# Patient Record
Sex: MALE | Race: WHITE | NOT HISPANIC OR LATINO | Employment: UNEMPLOYED | URBAN - METROPOLITAN AREA
[De-identification: names, ages, dates, MRNs, and addresses within clinical notes are randomized per-mention and may not be internally consistent; named-entity substitution may affect disease eponyms.]

---

## 2019-06-19 ENCOUNTER — HOSPITAL ENCOUNTER (OUTPATIENT)
Dept: BEHAVIORAL HEALTH | Facility: CLINIC | Age: 24
Discharge: HOME | End: 2019-06-19
Attending: SOCIAL WORKER | Admitting: SOCIAL WORKER
Payer: COMMERCIAL

## 2019-06-19 VITALS — HEIGHT: 70 IN | WEIGHT: 135 LBS | BODY MASS INDEX: 19.33 KG/M2

## 2019-06-19 PROCEDURE — H0001 ALCOHOL AND/OR DRUG ASSESS: HCPCS

## 2019-06-19 ASSESSMENT — ANXIETY QUESTIONNAIRES
GAD7 TOTAL SCORE: 2
1. FEELING NERVOUS, ANXIOUS, OR ON EDGE: NOT AT ALL
2. NOT BEING ABLE TO STOP OR CONTROL WORRYING: NOT AT ALL
6. BECOMING EASILY ANNOYED OR IRRITABLE: NOT AT ALL
4. TROUBLE RELAXING: SEVERAL DAYS
5. BEING SO RESTLESS THAT IT IS HARD TO SIT STILL: NOT AT ALL
7. FEELING AFRAID AS IF SOMETHING AWFUL MIGHT HAPPEN: NOT AT ALL
3. WORRYING TOO MUCH ABOUT DIFFERENT THINGS: SEVERAL DAYS

## 2019-06-19 ASSESSMENT — MIFFLIN-ST. JEOR: SCORE: 1613.61

## 2019-06-19 ASSESSMENT — PAIN SCALES - GENERAL: PAINLEVEL: NO PAIN (0)

## 2019-06-19 NOTE — PROGRESS NOTES
Rule 25 Assessment  Background Information   1. Date of Assessment Request 6/18/19 2. Date of Assessment  6/19/2019 3. Date Service Authorized     4.   GWEN Monson   5.  Phone Number   [phone] 6. Referent  [facility] Probation 7. Assessment Site  [facility]     8. Client Name   Clint Gay 9. Date of Birth  1995 Age  24 year old 10. Gender  male  11. PMI/ Insurance No.  [id number]   12. Client's Primary Language:  English 13. Do you require special accommodations, such as an  or assistance with written material? No   14. Current Address: [address] [place] [address]   15. Client Phone Numbers: [phone] (home)      16. Tell me what has happened to bring you here today.    Ended up getting a disorderly conduct charge, and now this is a probation requirement.    17. Have you had other rule 25 assessments?     No    DIMENSION I - Acute Intoxication /Withdrawal Potential   1. Chemical use most recent 12 months outside a facility and other significant use history (client self-report)              X = Primary Drug Used   Age of First Use Most Recent Pattern of Use and Duration   Need enough information to show pattern (both frequency and amounts) and to show tolerance for each chemical that has a diagnosis   Date of last use and time, if needed   Withdrawal Potential? Requiring special care Method of use  (oral, smoked, snort, IV, etc)      Alcohol     19 Past cpl years: 1-2 wknds/month, 2-3 beers.  Occasional day 1-2 beers w/dinner  Age [age] (): every wknd for a short period of time   6/9/19 no oral      Marijuana/  Hashish   unsp Denies any regular use. Age 21 no smoke      Cocaine/Crack     No use          Meth/  Amphetamines   No use          Heroin     No use          Other Opiates/  Synthetics   No use          Inhalants     No use          Benzodiazepines     No use          Hallucinogens     No use           Barbiturates/  Sedatives/  Hypnotics No use          Over-the-Counter Drugs   No use          Other     No use          Nicotine     unsp Juul, every other day. 20 no smoke     2. Do you use greater amounts of alcohol/other drugs to feel intoxicated or achieve the desired effect?  No.  Or use the same amount and get less of an effect?  No.  Example: The patient denied having any history of tolerance with alcohol and/or drugs.    3A. Have you ever been to detox?     Yes    3B. When was the first time?     Age 19    3C. How many times since then?     0    3D. Date of most recent detox:     Age 19    4.  Withdrawal symptoms: Have you had any of the following withdrawal symptoms?  Past 12 months Recent (past 30 days)   None None     's Visual Observations and Symptoms: alert and oriented    Based on the above information, is withdrawal likely to require attention as part of treatment participation?  No    Dimension I Ratings   Acute intoxication/Withdrawal potential - The placing authority must use the criteria in Dimension I to determine a client s acute intoxication and withdrawal potential.    RISK DESCRIPTIONS - Severity ratin Client displays full functioning with good ability to tolerate and cope with withdrawal discomfort. No signs or symptoms of intoxication or withdrawal or resolving signs or symptoms.    REASONS SEVERITY WAS ASSIGNED (What about the amount of the person s use and date of most recent use and history of withdrawal problems suggests the potential of withdrawal symptoms requiring professional assistance? )     No concern.         DIMENSION II - Biomedical Complications and Conditions   1a. Do you have any current health/medical conditions?(Include any infectious diseases, allergies, or chronic or acute pain, history of chronic conditions)       No    1b. On a scale of mild, moderate to severe please specify the severity of the patient's diabetes and/or neuropathy.    The patient  denied having a history of being diagnosed with diabetes or neuropathy.    2. Do you have a health care provider? When was your most recent appointment? What concerns were identified?     Kennedy Denson as needed    3. If indicated by answers to items 1 or 2: How do you deal with these concerns? Is that working for you? If you are not receiving care for this problem, why not?      The patient denied having any current clinical health issues.    4A. List current medication(s) including over-the-counter or herbal supplements--including pain management:     None    4B. Do you follow current medical recommendations/take medications as prescribed?     The patient denied taking any prescription or over the counter medications at this time.    4C. When did you last take your medication?     The patient denied taking any prescription or over the counter medications at this time.    4D. Do you need a referral to have a follow up with a primary care physician?    No.    5. Has a health care provider/healer ever recommended that you reduce or quit alcohol/drug use?     No    6. Are you pregnant?     NA, because the patient is male    7. Have you had any injuries, assaults/violence towards you, accidents, health related issues, overdose(s) or hospitalizations related to your use of alcohol or other drugs:     No    8. Do you have any specific physical needs/accommodations? No    Dimension II Ratings   Biomedical Conditions and Complications - The placing authority must use the criteria in Dimension II to determine a client s biomedical conditions and complications.   RISK DESCRIPTIONS - Severity ratin Client displays full functioning with good ability to cope with physical discomfort.    REASONS SEVERITY WAS ASSIGNED (What physical/medical problems does this person have that would inhibit his or her ability to participate in treatment? What issues does he or she have that require assistance to address?)    No health issues  reported.         DIMENSION III - Emotional, Behavioral, Cognitive Conditions and Complications   1. (Optional) Tell me what it was like growing up in your family. (substance use, mental health, discipline, abuse, support)     Raised by biological parents, 2 sisters and 1 brother, youngest.  Intact family.  No substance use, no mental health.  Denies any abuse.    2. When was the last time that you had significant problems...  A. with feeling very trapped, lonely, sad, blue, depressed or hopeless  about the future? Never    B. with sleep trouble, such as bad dreams, sleeping restlessly, or falling  asleep during the day? Never    C. with feeling very anxious, nervous, tense, scared, panicked, or like  something bad was going to happen? Never    D. with becoming very distressed and upset when something reminded  you of the past? Never    E. with thinking about ending your life or committing suicide? Never    3. When was the last time that you did the following things two or more times?  A. Lied or conned to get things you wanted or to avoid having to do  something? Never    B. Had a hard time paying attention at school, work, or home? Never    C. Had a hard time listening to instructions at school, work, or home? Never    D. Were a bully or threatened other people? Never    E. Started physical fights with other people? Never    Note: These questions are from the Global Appraisal of Individual Needs--Short Screener. Any item marked  past month  or  2 to 12 months ago  will be scored with a severity rating of at least 2.     For each item that has occurred in the past month or past year ask follow up questions to determine how often the person has felt this way or has the behavior occurred? How recently? How has it affected their daily living? And, whether they were using or in withdrawal at the time?    The patient did not identify as having any of the above items in the past month.    4A. If the person has answered  item 2E with  in the past year  or  the past month , ask about frequency and history of suicide in the family or someone close and whether they were under the influence.     The patient denied any family member or someone close to the patient had ever completed suicide.    Any history of suicide in your family? Or someone close to you?     Paternal uncle    4B. If the person answered item 2E  in the past month  ask about  intent, plan, means and access and any other follow-up information  to determine imminent risk. Document any actions taken to intervene  on any identified imminent risk.      The patient denied having any suicide ideation within the past month.    5A. Have you ever been diagnosed with a mental health problem?     No      5B. Are you receiving care for any mental health issues? If yes, what is the focus of that care or treatment?  Are you satisfied with the service? Most recent appointment?  How has it been helpful?     The patient denied having any current or past mental health issues that had required treatment.    6. Have you been prescribed medications for emotional/psychological problems?     The patient denied having any history of being prescribed psychotropic medications for mental health issues.    7. Does your MH provider know about your use?     The patient does not currently have any mental health providers.    8A. Have you ever been verbally, emotionally, physically or sexually abused?      The patient denied having any history of being verbally, emotionally, physically or sexually abused.     Follow up questions to learn current risk, continuing emotional impact.      The patient denied having any history of being verbally, emotionally, physically or sexually abused.    8B. Have you received counseling for abuse?      The patient denied having any history of being verbally, emotionally, physically or sexually abused.    9. Have you ever experienced or been part of a group that  experienced community violence, historical trauma, rape or assault?     No    10A. :    No    11. Do you have problems with any of the following things in your daily life?    No      Note: If the person has any of the above problems, follow up with items 12, 13, and 14. If none of the issues in item 11 are a problem for the person, skip to item 15.    The patient denied having any history of having problems with headaches, dizziness, problem solving, concentration, performing job/school work, remembering, in relationships with others, reading, writing, calculation, fights, being fired or arrests in his daily life.    12. Have you been diagnosed with traumatic brain injury or Alzheimer s?  No    13. If the answer to #12 is no, ask the following questions:    Have you ever hit your head or been hit on the head? No    Were you ever seen in the Emergency Room, hospital or by a doctor because of an injury to your head? No    Have you had any significant illness that affected your brain (brain tumor, meningitis, West Nile Virus, stroke or seizure, heart attack, near drowning or near suffocation)? No    14. If the answer to #12 is yes, ask if any of the problems identified in #11 occurred since the head injury or loss of oxygen. No    15A. Highest grade of school completed:     Associate degree/vocational certificate    15B. Do you have a learning disability? No    15C. Did you ever have tutoring in Math or English? No    15D. Have you ever been diagnosed with Fetal Alcohol Effects or Fetal Alcohol Syndrome? No    16. If yes to item 15 B, C, or D: How has this affected your use or been affected by your use?     The patient denied having any history of a learning disability, tutoring in math or English or being diagnosed with Fetal Alcohol Effects or Fetal Alcohol Syndrome.    Dimension III Ratings   Emotional/Behavioral/Cognitive - The placing authority must use the criteria in Dimension III to determine a client s  emotional, behavioral, and cognitive conditions and complications.   RISK DESCRIPTIONS - Severity ratin Client has good impulse control and coping skills and presents no risk of harm to self or others. Client functions in all life areas and displays no emotional, behavioral or cognitive problems or the problems are stable..    REASONS SEVERITY WAS ASSIGNED - What current issues might with thinking, feelings or behavior pose barriers to participation in a treatment program? What coping skills or other assets does the person have to offset those issues? Are these problems that can be initially accommodated by a treatment provider? If not, what specialized skills or attributes must a provider have?    Client denies any history of mental health diagnosis or current concern.  He denies any suicidal ideation.         DIMENSION IV - Readiness for Change   1. You ve told me what brought you here today. (first section) What do you think the problem really is?     It is anger, the night I got a little too mad and was being dumb.    2. Tell me how things are going. Ask enough questions to determine whether the person has use related problems or assets that can be built upon in the following areas: Family/friends/relationships; Legal; Financial; Emotional; Educational; Recreational/ leisure; Vocational/employment; Living arrangements (DSM)      No concerns.    3. What activities have you engaged in when using alcohol/other drugs that could be hazardous to you or others (i.e. driving a car/motorcycle/boat, operating machinery, unsafe sex, sharing needles for drugs or tattoos, etc     Driving    4. How much time do you spend getting, using or getting over using alcohol or drugs? (DSM)     One evening    5. Reasons for drinking/drug use (Use the space below to record answers. It may not be necessary to ask each item.)  Like the feeling Yes   Trying to forget problems No   To cope with stress No   To relieve physical pain No    To cope with anxiety No   To cope with depression No   To relax or unwind Yes   Makes it easier to talk with people No   Partner encourages use No   Most friends drink or use Yes   To cope with family problems No   Afraid of withdrawal symptoms/to feel better No   Other (specify)  No     A. What concerns other people about your alcohol or drug use/Has anyone told you that you use too much? What did they say? (DSM)     I don't think so.    B. What did you think about that/ do you think you have a problem with alcohol or drug use?     I do not.    6. What changes are you willing to make? What substance are you willing to stop using? How are you going to do that? Have you tried that before? What interfered with your success with that goal?      No changes needed.    7. What would be helpful to you in making this change?     No change needed.    Dimension IV Ratings   Readiness for Change - The placing authority must use the criteria in Dimension IV to determine a client s readiness for change.   RISK DESCRIPTIONS - Severity ratin Client is cooperative, motivated, ready to change, admits problems, committed to change, and engaged in treatment as a responsible participant.    REASONS SEVERITY WAS ASSIGNED - (What information did the person provide that supports your assessment of his or her readiness to change? How aware is the person of problems caused by continued use? How willing is she or he to make changes? What does the person feel would be helpful? What has the person been able to do without help?)      No identified concerns that require change.         DIMENSION V - Relapse, Continued Use, and Continued Problem Potential   1A. In what ways have you tried to control, cut-down or quit your use? If you have had periods of sobriety, how did you accomplish that? What was helpful? What happened to prevent you from continuing your sobriety? (DSM)     I have gone a couple months without use.  In a night maybe when  I had drank too much in one night thought I should have on more shot.    1B. What were the circumstances of your most recent relapse with mood altering chemicals?    No relapse.    2. Have you experienced cravings? If yes, ask follow up questions to determine if the person recognizes triggers and if the person has had any success in dealing with them.     The patient denied having any current cravings to use mood altering chemicals.    3. Have you been treated for alcohol/other drug abuse/dependence? No    4. Support group participation: Have you/do you attend support group meetings to reduce/stop your alcohol/drug use? How recently? What was your experience? Are you willing to restart? If the person has not participated, is he or she willing?     No.    5. What would assist you in staying sober/straight?     No change needed.    Dimension V Ratings   Relapse/Continued Use/Continued problem potential - The placing authority must use the criteria in Dimension V to determine a client s relapse, continued use, and continued problem potential.   RISK DESCRIPTIONS - Severity ratin Client recognizes risk well and is able to manage potential problems.    REASONS SEVERITY WAS ASSIGNED - (What information did the person provide that indicates his or her understanding of relapse issues? What about the person s experience indicates how prone he or she is to relapse? What coping skills does the person have that decrease relapse potential?)      Client denies any history of treatment or consequences from substance use.  Minimal risk identified for ongoing problems.         DIMENSION VI - Recovery Environment   1. Are you employed/attending school? Tell me about that.     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    2A. Describe a typical day; evening for you. Work, school, social, leisure, volunteer, spiritual practices. Include time spent obtaining, using, recovering from  drugs or alcohol. (DSM)     Wake up, go to work, hit the gym after work, then I go out a lot with my girlfriend for a walk or take my dog to the dog park or hang with friends.  Also, a lot of time we just relax.    Please describe what leisure activities have been associated with your substance abuse:     If we were going out on a boat maybe have some drinks but not when we play basketball or other sports.    2B. How often do you spend more time than you planned using or use more than you planned? (DSM)     Maybe like 10%    3. How important is using to your social connections? Do many of your family or friends use?     Social use    4A. Are you currently in a significant relationship?     Yes.  4B. How long? 2 years            Please describe your significant other's use of mood altering chemicals? No concerns.    4C. Sexual Orientation:     Heterosexual    5A. Who do you live with?      With my mom and dad    5B. Tell me about their alcohol/drug use and mental health issues.     None.    5C. Are you concerned for your safety there? No    5D. Are you concerned about the safety of anyone else who lives with you? No    6A. Do you have children who live with you?     No    6B. Do you have children who do not live with you?     No    7A. Who supports you in making changes in your alcohol or drug use? What are they willing to do to support you? Who is upset or angry about you making changes in your alcohol or drug use? How big a problem is this for you?      Family, girlfriend, friends    7B. This table is provided to record information about the person s relationships and available support It is not necessary to ask each item; only to get a comprehensive picture of their support system.  How often can you count on the following people when you need someone?   Partner / Spouse Always supportive   Parent(s)/Aunt(s)/Uncle(s)/Grandparents Always supportive   Sibling(s)/Cousin(s) Always supportive   Child(claudia) The patient  doesn't have any children.   Other relative(s) The patient doesn't have any current contact with other relatives.   Friend(s)/neighbor(s) Always supportive   Child(claudia) s father(s)/mother(s) The patient doesn't have any children.   Support group member(s) The patient denied having any current involvement with 12-step or other support group meetings.   Community of rodriguez members The patient denied having any current involvement with community rodriguez members.   /counselor/therapist/healer The patient denied having any current involvement with a , counselor, therapist or healer.   Other (specify) No     8A. What is your current living situation?     Independent living with family    8B. What is your long term plan for where you will be living?     Me and my girlfriend are moving out very soon    8C. Tell me about your living environment/neighborhood? Ask enough follow up questions to determine safety, criminal activity, availability of alcohol and drugs, supportive or antagonistic to the person making changes.      No concerns reported.    9. Criminal justice history: Gather current/recent history and any significant history related to substance use--Arrests? Convictions? Circumstances? Alcohol or drug involvement? Sentences? Still on probation or parole? Expectations of the court? Current court order? Any sex offenses - lifetime? What level? (DSM)    Current [facility] Probation for disorderly conduct.  Age 17 got a drinking and driving ticket.    10. What obstacles exist to participating in treatment? (Time off work, childcare, funding, transportation, pending retirement time, living situation)     The patient denied having any obstacles for participating in substance abuse treatment.    Dimension VI Ratings   Recovery environment - The placing authority must use the criteria in Dimension VI to determine a client s recovery environment.   RISK DESCRIPTIONS - Severity ratin Client is engaged  in structured, meaningful activity and has a supportive significant other, family, and living environment.    REASONS SEVERITY WAS ASSIGNED - (What support does the person have for making changes? What structure/stability does the person have in his or her daily life that will increase the likelihood that changes can be sustained? What problems exist in the person s environment that will jeopardize getting/staying clean and sober?)     Client reports he is employed full-time and denies any job concerns.  He lives with family and plans to move in with his girlfriend.  He reports he has supportive relationships.  He is on probation for a non-substance related legal charge.         Client Choice/Exceptions   Would you like services specific to language, age, gender, culture, Islam preference, race, ethnicity, sexual orientation or disability?  No    What particular treatment choices and options would you like to have? None    Do you have a preference for a particular treatment program? None    Criteria for Diagnosis     Criteria for Diagnosis  DSM-5 Criteria for Substance Use Disorder  Instructions: Determine whether the client currently meets the criteria for Substance Use Disorder using the diagnostic criteria in the DSM-V pp.[id number]. Current means during the most recent 12 months outside a facility that controls access to substances    Category of Substance Severity (ICD-10 Code / DSM 5 Code)     Alcohol Use Disorder The patient does not meet the criteria for an Alcohol use disorder.   Cannabis Use Disorder The patient does not meet the criteria for a Cannabis use disorder.   Hallucinogen Use Disorder The patient does not meet the criteria for a Hallucinogen use disorder.   Inhalant Use Disorder The patient does not meet the criteria for an Inhalant use disorder.   Opioid Use Disorder The patient does not meet the criteria for an Opioid use disorder.   Sedative, Hypnotic, or Anxiolytic Use Disorder The patient  does not meet the criteria for a Sedative/Hypnotic use disorder.   Stimulant Related Disorder The patient does not meet the criteria for a Stimulant use disorder.   Tobacco Use Disorder The patient does not meet the criteria for a Tobacco use disorder.   Other (or unknown) Substance Use Disorder The patient does not meet the criteria for a Other (or unknown) Substance use disorder.       Collateral Contact Summary   Number of contacts made: 2    Contact with referring person:  Yes    If court related records were reviewed, summarize here: No court records had been reviewed at the time of this documentation.    Information from collateral contacts supported/largely agreed with information from the client and associated risk ratings.      Rule 25 Assessment Summary and Plan   's Recommendation    No recommendations for substance use services at this time.      Collateral Contacts     Name:    [facility] Probation   Relationship:    probation   Phone Number:    [phone]  [phone] Releases:    Yes     6/19/19 faxed MARQUITA and requested collateral.      Collateral Contacts     Name:    Laurie Granados   Relationship:    girlfriend   Phone Number:    [phone]   Releases:    Yes     No concerns.  Known him for 3-4 years.  No history of concerns, he just gets angry when he does drink.  Does not drink that often, just once or twice on a weekend.    ollateral Contacts      A problematic pattern of alcohol/drug use leading to clinically significant impairment or distress, as manifested by at least two of the following, occurring within a 12-month period:    None      Specify if: In early remission:  After full criteria for alcohol/drug use disorder were previously met, none of the criteria for alcohol/drug use disorder have been met for at least 3 months but for less than 12 months (with the exception that Criterion A4,  Craving or a strong desire or urge to use alcohol/drug  may be met).     In sustained  remission:   After full criteria for alcohol use disorder were previously met, none of the criteria for alcohol/drug use disorder have been met at any time during a period of 12 months or longer (with the exception that Criterion A4,  Craving or strong desire or urge to use alcohol/drug  may be met).   Specify if:   This additional specifier is used if the individual is in an environment where access to alcohol is restricted.    Mild: Presence of 2-3 symptoms  Moderate: Presence of 4-5 symptoms  Severe: Presence of 6 or more symptoms

## 2019-06-19 NOTE — PROGRESS NOTES
[facility]  [address] [place] [address] [address]  [place] [address]        ADULT CD ASSESSMENT ADDENDUM      Patient Name: Clint Gay  Cell Phone:   Home: [phone] (home)    Mobile:   Telephone Information:   Mobile [phone]       Email:  [email or web address]  Emergency Contact: Laurie Granados   Tel: [phone]    The patient reported being:  Single, in a serious relationship    With which race do you identify? White    Initial Screening Questions     1. Are you currently having severe withdrawal symptoms that are putting yourself or others in danger?  No    2. Are you currently having severe medical problems that require immediate attention?  No    3. Are you currently having severe emotional or behavioral problems that are putting yourself or others at risk of harm?  No    4. Do you have sufficient reading skills that will enable you to understand written materials, including the program rules and client rights materials?  Yes     Family History and other additional information     Who raised you? (parents, grandparents, adoptive parents, step-parents, etc.)    Both Parents    Please tell me what it was like growing up in your family. (please include any history of substance abuse, mental health issues, emotional/physical/sexual abuse, forms of discipline, and support)     Raised by biological parents, 2 sisters and 1 brother, youngest.  Intact family.  No substance use, no mental health.  Denies any abuse    Do you have any children or Stepchildren? No    Are you being investigated by Child Protection Services? No    Do you have a child protection worker, probation office or ?  Yes, explain: [facility]ation    How would you describe your current finances?  Doing well    If you are having problems, (unpaid bills, bankruptcy, IRS problems) please explain:  No    If working or a student are you able to function appropriately in that setting? Yes     Describe your  preferred learning style:  by hands-on practice    What are your some of your personal strengths?  hanging with the right people    Do you currently participate in community rodriguez activities, such as attending Buddhism, temple, Caodaism or Alevism services?  No    How does your spirituality impact your recovery?  Not in recovery    Do you currently self-administer your medications?  The patient is not currently taking any prescription or over the counter medications.    Have you ever had to lie to people important to you about how much you gallo?   No   Have you ever felt the need to bet more and more money?   No   Have you ever attempted treatment for a gambling problem?   No   Have you ever touched or fondled someone else inappropriately or forced them to have sex with you against their will?   No   Are you or have you ever been a registered sex offender?   No   Is there any history of sexual abuse in your family? No   Have you ever felt obsessed by your sexual behavior, such as having sex with many partners, masturbating often, using pornography often?   No     Have you ever received therapy or stayed in the hospital for mental health problems?   No     Have you ever hurt yourself, such as cutting, burning or hitting yourself?   No     Have you ever purged, binged or restricted yourself as a way to control your weight?   No     Are you on a special diet?   No     Do you have any concerns regarding your nutritional status?   No     Have you had any appetite changes in the last 3 months?   No   Have you had weight loss or weight gain of more than 10 lbs in the last 3 months?   If patient gained or lost more than 10 lbs, then refer to program RN / attending Physician for assessment.   No   Was the patient informed of BMI?       No   Have you engaged in any risk-taking behavior that would put you at risk for exposure to blood-borne or sexually transmitted diseases?   No   Do you have any dental problems?   No   Have  you ever lived through any trauma or stressful life events?   No   In the past month, have you had any of the following symptoms related to the trauma listed above? (dreams, intense memories, flashbacks, physical reactions, etc.)   No   Have you ever believed people were spying on you, or that someone was plotting against you or trying to hurt you?   No   Have you ever believed someone was reading your mind or could hear your thoughts or that you could actually read someone's mind or hear what another person was thinking?   No   Have you ever believed that someone of some force outside of yourself was putting thoughts into your mind or made you act in a way that was not your usual self?  Have you ever though you were possessed?   No   Have you ever believed you were being sent special messages through the TV, radio or newspaper?   No   Have you ever heard things other people couldn't hear, such as voices or other noises?   No   Have you ever had visions when you were awake?  Or have you ever seen things other people couldn't see?   No   Do you have a valid 's license?    Yes     PHQ-9, SORIN-7 and Suicide Risk Assessment   PHQ-9 on 6/19/2019 SORIN-7 on 6/19/2019   The patient's PHQ-9 score was 2 out of 27, indicating minimal depression.   The patient's SORIN-7 score was not completed       Crenshaw-Suicide Severity Rating Scale   Suicide Ideation   1.) Have you ever wished you were dead or that you could go to sleep and not wake up?     Lifetime:  No   Past Month:  No     2.) Have you actually had any thoughts of killing yourself?   Lifetime:  No   Past Month:  No     3.) Have you been thinking about how you might do this?     Lifetime:  No   Past Month:  No     4.) Have you had these thoughts and had some intention of acting on them?     Lifetime:  No   Past Month:  No     5.) Have you started to work out the details of how to kill yourself?   Lifetime:  No   Past Month:  No     6.) Do you intend to carry out this  plan?      Lifetime:  No   Past Month:  No     Intensity of Ideation   Intensity of ideation (1 being least severe, 5 being most severe):     Lifetime:  The patient denied ever having any suicidal thoughts in life.   Past Month:  The patient denied having any suicidal thoughts within the past month.     How often do you have these thoughts?  The patient denied ever having any suicidal thoughts in life.     When you have the thoughts how long do they last?  The patient denied ever having any suicidal thoughts in life.     Can you stop thinking about killing yourself or wanting to die if you want to?  The patient denied ever having any suicidal thoughts in life.     Are there things - anyone or anything (i.e. family, Lutheran, pain of death) that stopped you from wanting to die or acting on thoughts of suicide?  Does not apply     What sort of reasons did you have for thinking about wanting to die or killing yourself (ie end pain, stop how you were feeling, get attention or reaction, revenge)?  Does not apply     Suicidal Behavior   (Suicide Attempt) - Have you made a suicide attempt?     Lifetime:  The patient had never made a suicide attempt.   Past Month:  The patient had not made a suicide attempt within the past month.     Have you engaged in self-harm (non-suicidal self-injury)?  The patient denied having any history of engaging in self-harm (non-suicidal self-injury).     (Interrupted Attempt) - Has there been a time when you started to do something to end your life but someone or something stopped you before you actually did anything?  The patient denied having any history of an interrupted suicide attempt.     (Aborted or Self-Interrupted Attempt) - Has there been a time when you started to do something to try to end your life but you stopped yourself before you actually did anything?  The patient denied having any history of an aborted or self-interrupted suicide attempt.     (Preparatory Acts of Behavior) -  "Have you taken any steps towards making suicide attempt or preparing to kill yourself (such as collecting pills, getting a gun, giving valuables away or writing a suicide note)?  The patient denied having any history of taking any steps towards making a suicide attempt or preparing to kill self.     Actual Lethality/Medical Damage:  The patient denied ever making a suicidal attempt.       2008  [facility]  Used with permission by Lesia Coppola, PhD.       Guide to C-SSRS Risk Ratings   NO IDEATION:  with no active thoughts IDEATION: with a wish to die. IDEATION: with active thoughts. Risk Ratings   If Yes No No 0 - Very Low Risk   If NA Yes No 1 - Low Risk   If NA Yes Yes 2 - Low/moderate risk   IDEATION: associated thoughts of methods without intent or plan INTENT: Intent to follow through on suicide PLAN: Plan to follow through on suicide Risk Ratings cont...   If Yes No No 3 - Moderate Risk   If Yes Yes No 4 - High Risk   If Yes Yes Yes 5 - High Risk   The patient's ADDITIONAL RISK FACTORS and lack of PROTECTIVE FACTORS may increase their overall suicide risk ratings.     Additional Risk Factors:    No additional risk factors   Protective Factors:    Having people in his/her life that would prevent the patient from considering a suicide attempt (i.e. young children, spouse, parents, etc.)     Having easy access to supportive family members     Risk Status   Past month:0. - Very Low Risk:  Evaluation Counselors:  Document in Epic / SBAR to counselor \"Very Low Risk\".      Treatment Counselors:  Reassess upon admission as applicable, assess weekly in progress notes under Dimension 3 and summarize in Discharge / Treatment summary under Dimension 3.    Past 24 hours:0. - Very Low Risk:  Evaluation Counselors:  Document in Epic / [employer] to counselor \"Very Low Risk\".      Treatment Counselors:  Reassess upon admission as applicable, assess weekly in progress notes under Dimension 3 and " summarize in Discharge / Treatment summary under Dimension 3.   Additional information to support suicide risk rating: There was no additional information to provide at this time.     Mental Health Status   Physical Appearance/Attire: Appears stated age and Attire appropriate to age/situation   Hygiene: well groomed   Eye Contact: at examiner   Speech Rate:  regular   Speech Volume: regular   Speech Quality: fluid   Cognitive/Perceptual:  reality based   Cognition: memory intact    Judgment: intact   Insight: intact   Orientation:  time, place, person and situation   Thought: logical    Hallucinations:  none   General Behavioral Tone: cooperative   Psychomotor Activity: no problem noted   Gait:  no problem   Mood: appropriate   Affect: congruence/appropriate   Counselor Notes: NA     Criteria for Diagnosis: DSM-5 Criteria for Substance Use Disorders      The patient does not currently identify with a DSM-5 substance use disorder.    Level of Care   I.) Intoxication and Withdrawal: 0   II.) Biomedical:  0   III.) Emotional and Behavioral:  0   IV.) Readiness to Change:  0   V.) Relapse Potential: 0   VI.) Recovery Environmental: 0     Initial Problem List     None    Patient/Client is willing to follow treatment recommendations.  Yes    Counselor: Nisa Cowan [facility]       Vulnerable Adult Checklist for OUTPATIENTS     1.  Do you have a physical, emotional or mental infirmity or dysfunction?       No    2.  Does this issue impair your ability to provide for your own care without help, including providing yourself with food, shelter, clothing, healthcare or supervision?       No    3.  Because of this issue, I need assistance to protect myself from maltreatment by others.      No    Based on the above information:    This person is not a functional Vulnerable Adult according to [place] Statute 626.5572 subdivision 21.

## 2019-06-20 ASSESSMENT — ANXIETY QUESTIONNAIRES: GAD7 TOTAL SCORE: 2

## 2019-06-27 NOTE — PROGRESS NOTES
Visit Date:   06/19/2019      CHEMICAL DEPENDENCY ASSESSMENT      EVALUATION COUNSELOR:  GWEN Garner.   CLIENT'S ADDRESS:  [address] [address] [facility] [place] [address].   TELEPHONE:  [phone].   STATISTICS:  YOB: 1995.  Age:  24.  Sex:  Male.  Marital Status:  Single.   INSURANCE:  [facility].   REFERRAL SOURCE:  [facility].      REASON FOR EVALUATION:  Clint Gay reports he received a disorderly conduct charge.  He is now on probation, and is required to have a substance use evaluation.      HEALTH HISTORY AND MEDICATIONS:  Client denies any health conditions or concerns or any current medications.      HISTORY OF PREVIOUS TREATMENT AND COUNSELING:  Client does report 1 detox admission around the age of 19.  He denies any history of any hospitalizations related to drug or alcohol use.  Denies any past or current individual therapy or counseling, and any history of substance use treatment, or support group meetings.      HISTORY OF ALCOHOL AND DRUG USE:  Client reports alcohol use, age of first use 19.  Reports for the past couple of years, he usually drinks 1-2 weekends a month, maybe 2-3 beers per time and may have an occasional day outside of that where he may have 1-2 beers with dinner.  Does report some heavy use from the age of 19 and 20 where he was drinking almost every weekend for a short period of time.  Last use 06/09/2019.  He denies any other substance use.      SUMMARY OF CHEMICAL DEPENDENCY SYMPTOMS ACKNOWLEDGED BY CLIENT:  Client does not identify with any DSM-V criteria for impression of a substance use disorder.      SUMMARY OF COLLATERAL DATA:  I spoke with client's girlfriend Laurie Rubiotuan and she reports she has known the client for 3 or 4 years and has no current concerns with substance use and no history of concerns.  Also faxed release of information to [facility] to obtain legal history and collateral information.      MENTAL STATUS  ASSESSMENT:  Physical appearance and attire:  Appears stated age, in attire appropriate to age and situation.  Hygiene well groomed.  Eye contact at examiner.  Speech regular, volume regular, quality fluid.  Cognitive perceptual reality based.  Cognition:  Memory intact, judgment intact, insight intact.  Orientation to time, place, person and situation.  Thought logical.  Hallucinations:  None.  General behavioral tone:  Cooperative.  Psychomotor activity:  No problem noted.  Gait:  No problem.  Mood appropriate.  Affect congruent and appropriate.      VULNERABLE ADULT ASSESSMENT:  This person is not a functional vulnerable adult according to [place] statute 626.5572, subdivision 21.      IMPRESSION:  No diagnosis.      [facility] PLACEMENT CRITERIA:   DIMENSION 1:  Intoxication/withdrawal:  Risk level 0.  No concerns.      DIMENSION 2:  Biomedical Conditions:  Risk level 0.  No health issues reported.      DIMENSION 3:  Emotional/Behavioral:  Risk level 0.  Client denies any history of mental health diagnosis or current concern.  He denies any suicidal ideation.      DIMENSION 4:  Readiness to Change:  Risk level 0.  No identified concerns that require change.      DIMENSION 5:  Relapse and Continued Use Potential:  Risk level 0.  Client denies any history of treatment or consequences from substance use.  Minimal risk identified for ongoing problems.      DIMENSION 6:  Recovery Environment:  Risk level 0.  Client reports he is employed full-time and denies any job concerns.  He lives with family and plans to move in with his girlfriend.  He reports he has supportive relationships.  He is on probation for a nonsubstance related legal charge.      INITIAL PROBLEM LIST:  None.      RECOMMENDATIONS:  No recommendations for substance use services at this time.     RATIONALE:  Client does not meet criteria requiring any substance use services.        This information has been disclosed to you from records protected by  Federal confidentiality rules (42 CFR part 2). The Federal rules prohibit you from making any further disclosure of this information unless further disclosure is expressly permitted by the written consent of the person to whom it pertains or as otherwise permitted by 42 CFR part 2. A general authorization for the release of medical or other information is NOT sufficient for this purpose. The Federal rules restrict any use of the information to criminally investigate or prosecute any alcohol or drug abuse patient.      GWEN CORMIER             D: 2019   T: 2019   MT: FRED      Name:     SHAHIDA SIN   MRN:      [id number]        Account:      [id number]   :      1995           Visit Date:   2019      Document: [id number]

## 2021-05-08 ENCOUNTER — HOSPITAL ENCOUNTER (EMERGENCY)
Facility: CLINIC | Age: 26
Discharge: HOME | End: 2021-05-08
Attending: EMERGENCY MEDICINE | Admitting: EMERGENCY MEDICINE
Payer: COMMERCIAL

## 2021-05-08 VITALS
TEMPERATURE: 98 F | DIASTOLIC BLOOD PRESSURE: 92 MMHG | SYSTOLIC BLOOD PRESSURE: 134 MMHG | HEART RATE: 117 BPM | RESPIRATION RATE: 14 BRPM | OXYGEN SATURATION: 100 %

## 2021-05-08 DIAGNOSIS — S01.81XA FACIAL LACERATION, INITIAL ENCOUNTER: ICD-10-CM

## 2021-05-08 PROCEDURE — 99283 EMERGENCY DEPT VISIT LOW MDM: CPT

## 2021-05-08 PROCEDURE — 12011 RPR F/E/E/N/L/M 2.5 CM/<: CPT

## 2021-05-08 ASSESSMENT — ENCOUNTER SYMPTOMS
HEADACHES: 0
WOUND: 1
VOMITING: 0

## 2021-05-08 NOTE — ED TRIAGE NOTES
Patient has a laceration on his left eyebrow and bruising around left eye from injury last night at midnight.  Patient is not sharing details of what happened. Denies LOC, states he is safe and home and denies suicidal ideation.    ABCs intact.  Patient is alert and oriented x3.

## 2021-05-08 NOTE — ED PROVIDER NOTES
History   Chief Complaint:  Eyebrow Laceration     HPI   Clint Gay is a 26 year old male who presents with a left eyebrow laceration. The patient states his head came in contact with granite last night around 0000 during which he sustained an injury to his left eyebrow. Denies loss of consciousness, headache, vision changes, or vomiting. No other symptoms. Last Tetanus in 2019.     Review of Systems   Eyes: Negative for visual disturbance.   Gastrointestinal: Negative for vomiting.   Skin: Positive for wound.   Neurological: Negative for syncope and headaches.   All other systems reviewed and are negative.      Allergies:  Cefzil    Medications:  None    Past Medical History:    Patient denies any medical conditions     Social History:  Presents alone   Alcohol use     Physical Exam     Patient Vitals for the past 24 hrs:   BP Temp Temp src Pulse Resp SpO2   05/08/21 0958 (!) 134/92 98  F (36.7  C) Tympanic 117 14 100 %       Physical Exam  HENT:    Eyes: Conjunctivae are normal. No scleral icterus. Globe is normal appearing. EOROM intact. PERRL.  Neck:Moving neck freely.   Cardiovascular: Normal rate intact distal pulses.    Pulmonary/Chest: Effort normal   Neurological:Alert and oriented. Coordination normal. Normal, steady gait.   Skin: Skin is warm and dry. Laceration to left brow. See below for details. Small amount of surrounding swelling with ecchymosis.   Psychiatric: Normal mood and affect.     Emergency Department Course     Procedures    Laceration Repair        LACERATION:  A simple and superficial clean 2.2 cm laceration.      LOCATION:  Left eyebrow      FUNCTION:  Distally sensation, circulation, motor and tendon function are intact.      ANESTHESIA:  Local using lidocaine with epi total of 3 mLs      PREPARATION:  Irrigation with Normal Saline      DEBRIDEMENT:  no debridement and wound explored, no foreign body found      CLOSURE:  Wound was closed with One Layer.  Skin closed with  5-0  fast absorbing gut using interrupted # 10 sutures.     Emergency Department Course:    Reviewed:  I reviewed nursing notes, vitals and past medical history    Assessments:  1030 I obtained history and examined the patient as noted above.     1044 I performed a laceration repair as above. The patient is comfortable with discharge.     Disposition:  The patient was discharged to home.     Impression & Plan     CMS Diagnoses: None    Medical Decision Making:  The patient presented with a laceration as described above.  The wound was carefully evaluated and explored.  The laceration was closed with sutures as documented after discussion about the pros and cons of an absorbable suture versus nylon suture..  At this point there is no evidence of muscular, tendon,  bony damage or concern for foreign body with this laceration.  The patient has no signs or symptoms of globe injury or head injury.    We have discussed possible complications, such as infection, unacceptable scar.  I have recommended that the patient keep the wound dry for 24-48 hours and then avoid submersion for 1 week. The patient understands to watch for signs of symptoms of infection. I have recommended daily dressing changes with petroleum jelly. The patient understands to follow up with primary care for suture removal as noted in the discharge instructions if they do not dissolve as anticipated.     Diagnosis:    ICD-10-CM    1. Facial laceration, initial encounter  S01.81XA      Scribe Disclosure:  Mica LÓPEZ, am serving as a scribe at 10:22 AM on 5/8/2021 to document services personally performed by Sharon Peralta MD based on my observations and the provider's statements to me.             Sharon Peralta MD  05/08/21 2981

## 2021-05-08 NOTE — DISCHARGE INSTRUCTIONS
Discharge Instructions  Lacerations and Absorbable Suture      Your laceration was repaired using an absorbable suture which means that the suture should fall out on its own however this does not always happen. Please carefully review the instructions below for care the wound.         Home care  Keep the wound clean and dry for 24 hours. After that, you can wash it gently with soap and water.   Put bacitracin or another antibiotic ointment on the wound 2 times a day. This will help keep the stitches from sticking and prevent infection.   If the stitches haven t started coming out after 7 days, you can put a warm, wet washcloth on the stitches for a few minutes a few times a day. Then, gently rub the stitches to help them come out.   If the stitches don t fall out after 8 days, please make an appointment with your Primary Care Clinic.      When the wound has healed, use sunscreen on it every time you will be in the sun for the next year or so. This will help the scar fade.           When to get help  Please return contact your primary doctor if the stitches don t come out after  days or  If you   have a fever  have pus or blood leaking from the wound  notice wound becomes very red or painful.  Call if you have any other concerns.

## 2024-05-15 ENCOUNTER — TELEPHONE (OUTPATIENT)
Dept: BEHAVIORAL HEALTH | Facility: CLINIC | Age: 29
End: 2024-05-15
Payer: COMMERCIAL

## 2024-05-15 ENCOUNTER — HOSPITAL ENCOUNTER (EMERGENCY)
Facility: CLINIC | Age: 29
Discharge: PSYCHIATRIC OR SUBSTANCE USE FACILITY | End: 2024-05-20
Attending: STUDENT IN AN ORGANIZED HEALTH CARE EDUCATION/TRAINING PROGRAM | Admitting: STUDENT IN AN ORGANIZED HEALTH CARE EDUCATION/TRAINING PROGRAM
Payer: COMMERCIAL

## 2024-05-15 DIAGNOSIS — F23 ACUTE PSYCHOSIS (H): ICD-10-CM

## 2024-05-15 PROBLEM — F12.90 CANNABIS USE, UNSPECIFIED, UNCOMPLICATED: Status: ACTIVE | Noted: 2024-05-15

## 2024-05-15 PROBLEM — F41.1 GAD (GENERALIZED ANXIETY DISORDER): Status: ACTIVE | Noted: 2024-05-15

## 2024-05-15 PROBLEM — F32.3 SEVERE MAJOR DEPRESSION WITH PSYCHOTIC FEATURES (H): Status: ACTIVE | Noted: 2024-05-15

## 2024-05-15 PROBLEM — F90.9 ATTENTION DEFICIT HYPERACTIVITY DISORDER: Status: ACTIVE | Noted: 2024-05-15

## 2024-05-15 PROBLEM — F10.10 ALCOHOL ABUSE: Status: ACTIVE | Noted: 2024-05-15

## 2024-05-15 LAB
ALBUMIN SERPL BCG-MCNC: 5 G/DL (ref 3.5–5.2)
ALP SERPL-CCNC: 94 U/L (ref 40–150)
ALT SERPL W P-5'-P-CCNC: 382 U/L (ref 0–70)
AMPHETAMINES UR QL SCN: ABNORMAL
ANION GAP SERPL CALCULATED.3IONS-SCNC: 20 MMOL/L (ref 7–15)
AST SERPL W P-5'-P-CCNC: 198 U/L (ref 0–45)
BARBITURATES UR QL SCN: ABNORMAL
BASOPHILS # BLD AUTO: 0.1 10E3/UL (ref 0–0.2)
BASOPHILS NFR BLD AUTO: 1 %
BENZODIAZ UR QL SCN: ABNORMAL
BILIRUB SERPL-MCNC: 3.3 MG/DL
BUN SERPL-MCNC: 4.9 MG/DL (ref 6–20)
BZE UR QL SCN: ABNORMAL
CALCIUM SERPL-MCNC: 10.1 MG/DL (ref 8.6–10)
CANNABINOIDS UR QL SCN: ABNORMAL
CHLORIDE SERPL-SCNC: 92 MMOL/L (ref 98–107)
CREAT SERPL-MCNC: 0.72 MG/DL (ref 0.67–1.17)
DEPRECATED HCO3 PLAS-SCNC: 27 MMOL/L (ref 22–29)
EGFRCR SERPLBLD CKD-EPI 2021: >90 ML/MIN/1.73M2
EOSINOPHIL # BLD AUTO: 0 10E3/UL (ref 0–0.7)
EOSINOPHIL NFR BLD AUTO: 1 %
ERYTHROCYTE [DISTWIDTH] IN BLOOD BY AUTOMATED COUNT: 12.1 % (ref 10–15)
FENTANYL UR QL: ABNORMAL
GLUCOSE SERPL-MCNC: 123 MG/DL (ref 70–99)
HCT VFR BLD AUTO: 44.8 % (ref 40–53)
HGB BLD-MCNC: 15.6 G/DL (ref 13.3–17.7)
HOLD SPECIMEN: NORMAL
IMM GRANULOCYTES # BLD: 0 10E3/UL
IMM GRANULOCYTES NFR BLD: 0 %
LYMPHOCYTES # BLD AUTO: 1.1 10E3/UL (ref 0.8–5.3)
LYMPHOCYTES NFR BLD AUTO: 20 %
MCH RBC QN AUTO: 32.2 PG (ref 26.5–33)
MCHC RBC AUTO-ENTMCNC: 34.8 G/DL (ref 31.5–36.5)
MCV RBC AUTO: 92 FL (ref 78–100)
MONOCYTES # BLD AUTO: 0.6 10E3/UL (ref 0–1.3)
MONOCYTES NFR BLD AUTO: 10 %
NEUTROPHILS # BLD AUTO: 4 10E3/UL (ref 1.6–8.3)
NEUTROPHILS NFR BLD AUTO: 68 %
NRBC # BLD AUTO: 0 10E3/UL
NRBC BLD AUTO-RTO: 0 /100
OPIATES UR QL SCN: ABNORMAL
PCP QUAL URINE (ROCHE): ABNORMAL
PLATELET # BLD AUTO: 300 10E3/UL (ref 150–450)
POTASSIUM SERPL-SCNC: 3.9 MMOL/L (ref 3.4–5.3)
PROT SERPL-MCNC: 7.6 G/DL (ref 6.4–8.3)
RBC # BLD AUTO: 4.85 10E6/UL (ref 4.4–5.9)
SODIUM SERPL-SCNC: 139 MMOL/L (ref 135–145)
WBC # BLD AUTO: 5.8 10E3/UL (ref 4–11)

## 2024-05-15 PROCEDURE — 99285 EMERGENCY DEPT VISIT HI MDM: CPT

## 2024-05-15 PROCEDURE — 85025 COMPLETE CBC W/AUTO DIFF WBC: CPT | Performed by: STUDENT IN AN ORGANIZED HEALTH CARE EDUCATION/TRAINING PROGRAM

## 2024-05-15 PROCEDURE — 80053 COMPREHEN METABOLIC PANEL: CPT | Performed by: STUDENT IN AN ORGANIZED HEALTH CARE EDUCATION/TRAINING PROGRAM

## 2024-05-15 PROCEDURE — 36415 COLL VENOUS BLD VENIPUNCTURE: CPT | Performed by: STUDENT IN AN ORGANIZED HEALTH CARE EDUCATION/TRAINING PROGRAM

## 2024-05-15 PROCEDURE — 80307 DRUG TEST PRSMV CHEM ANLYZR: CPT | Performed by: STUDENT IN AN ORGANIZED HEALTH CARE EDUCATION/TRAINING PROGRAM

## 2024-05-15 RX ORDER — HYDROXYZINE HYDROCHLORIDE 25 MG/1
25 TABLET, FILM COATED ORAL EVERY 4 HOURS PRN
Status: DISCONTINUED | OUTPATIENT
Start: 2024-05-15 | End: 2024-05-20 | Stop reason: HOSPADM

## 2024-05-15 RX ORDER — NICOTINE 21 MG/24HR
1 PATCH, TRANSDERMAL 24 HOURS TRANSDERMAL DAILY
Status: DISCONTINUED | OUTPATIENT
Start: 2024-05-15 | End: 2024-05-19

## 2024-05-15 RX ORDER — OLANZAPINE 5 MG/1
5 TABLET, ORALLY DISINTEGRATING ORAL EVERY 6 HOURS PRN
Status: DISCONTINUED | OUTPATIENT
Start: 2024-05-15 | End: 2024-05-20 | Stop reason: HOSPADM

## 2024-05-15 ASSESSMENT — ACTIVITIES OF DAILY LIVING (ADL)
ADLS_ACUITY_SCORE: 35

## 2024-05-15 ASSESSMENT — COLUMBIA-SUICIDE SEVERITY RATING SCALE - C-SSRS
2. HAVE YOU ACTUALLY HAD ANY THOUGHTS OF KILLING YOURSELF IN THE PAST MONTH?: NO
1. IN THE PAST MONTH, HAVE YOU WISHED YOU WERE DEAD OR WISHED YOU COULD GO TO SLEEP AND NOT WAKE UP?: NO
6. HAVE YOU EVER DONE ANYTHING, STARTED TO DO ANYTHING, OR PREPARED TO DO ANYTHING TO END YOUR LIFE?: NO

## 2024-05-15 NOTE — ED NOTES
IP MH Referral Acuity Rating Score (RARS)    LMHP complete at referral to IP MH, with DEC; and, daily while awaiting IP MH placement. Call score to PPS.  CRITERIA SCORING   New 72 HH and Involuntary for IP MH (not adolescent) 0/1   Boarding over 24 hours 0/1   Vulnerable adult at least 55+ with multiple co morbidities; or, Patient age 11 or under 0/1   Suicide ideation without relief of precipitating factors 0/1   Current plan for suicide 0/1   Current plan for homicide 0/1   Imminent risk or actual attempt to seriously harm another without relief of factors precipitating the attempt 0/1   Severe dysfunction in daily living (ex: complete neglect for self care, extreme disruption in vegetative function, extreme deterioration in social interactions) 1/1   Recent (last 2 weeks) or current physical aggression in the ED 0/1   Restraints or seclusion episode in ED 0/1   Verbal aggression, agitation, yelling, etc., while in the ED 0/1   Active psychosis with psychomotor agitation or catatonia 0/1   Need for constant or near constant redirection (from leaving, from others, etc).  0/1   Intrusive or disruptive behaviors 1/1   TOTAL Acuity Total Score: 2

## 2024-05-15 NOTE — ED PROVIDER NOTES
"  Emergency Department Note      History of Present Illness     Chief Complaint  Psychiatric Evaluation    HPI  Clint Gay is a very pleasant 29 year old male presenting with psychiatric evaluation. Patient notes he has been \"going crazy\" the past couple days and reports increased paranoia.  Per EMS, patient was at the EMS station looking for his family member who works and police were called because the patient was paranoid and delusional.  Clint notes that he thinks people are threatening him. He has not had negative interactions with anyone and does not have specific examples. Patient does not feel safe. Patient denies SI, HI, hallucinations, or drug use. Patient notes a history of psychosis. Clint denies history of similar symptoms. He endorses slight alcohol use yesterday. Denies drug use.  He did drink alcohol yesterday but no significant amount.         Independent Historian  None. Only the patient provided history.    Review of External Notes  None.  Past Medical History   Medical History and Problem List  Psychosis     Medications  The patient is not currently taking any regular medications.      Surgical History   Tonsillectomy   Tympanoplasty  Hernia repair   Physical Exam   Patient Vitals for the past 24 hrs:   BP Temp Pulse Resp SpO2   05/15/24 1549 138/86 -- 106 18 99 %   05/15/24 1351 (!) 145/104 99.2  F (37.3  C) (!) 128 18 --      Physical Exam  Vitals and nursing note reviewed.   Constitutional:       Appearance: Normal appearance. He is not ill-appearing or diaphoretic.   HENT:      Head: Atraumatic.   Eyes:      Extraocular Movements: Extraocular movements intact.   Cardiovascular:      Rate and Rhythm: Tachycardia present.   Pulmonary:      Effort: Pulmonary effort is normal.   Musculoskeletal:         General: No deformity or signs of injury. Normal range of motion.   Neurological:      General: No focal deficit present.      Mental Status: He is alert and oriented to person, place, and " time.      Gait: Gait normal.   Psychiatric:         Attention and Perception: He is inattentive. He does not perceive auditory or visual hallucinations.         Mood and Affect: Mood is anxious.         Speech: Speech normal.         Behavior: Behavior is withdrawn.         Thought Content: Thought content is paranoid and delusional. Thought content does not include homicidal or suicidal ideation.          Diagnostics   Lab Results   Labs Ordered and Resulted from Time of ED Arrival to Time of ED Departure - No data to display    Imaging  No orders to display       EKG   No ECG performed.       Independent Interpretation  None  ED Course    Medications Administered  Medications   OLANZapine zydis (zyPREXA) ODT tab 5 mg (has no administration in time range)       Procedures  Procedures   None performed    Discussion of Management  None    Social Determinants of Health adding to complexity of care  None.      ED Course  ED Course as of 05/15/24 1610   Wed May 15, 2024   1401 I obtained the history and examined the patient as noted above.          Medical Decision Making / Diagnosis   CMS Diagnoses: None    MIPS     None    MDM  Patient presenting with paranoia.  Vital signs notable for tachycardia upon arrival.  Patient has a guarded affect, slow to respond to questions, possibly reacting to internal stimuli.  Patient does not appear intoxicated.  Patient states he has a history of psychosis but did not see anything in the chart to this effect.  Patient is cautious about accepting medications for his symptoms.  Will plan for DEC evaluation and observation prior to determining disposition.  Placed patient on an CHALO.  I do feel he is holdable at this time due to psychosis.  He is currently here voluntarily.     4:10 PM DEC assessed the patient and felt that he is appropriate for inpatient management due to psychosis.  I am in agreement with this plan.  Labs were ordered.  Transfer pending.     Disposition  Care of the  patient was transferred to my colleague Dr. Pickard pending transfer    ICD-10 Codes:    ICD-10-CM    1. Acute psychosis (H)  F23            Discharge Medications  New Prescriptions    No medications on file     Scribe Disclosure:  I, Tamika Rawls, am serving as a scribe at 2:15 PM on 5/15/2024 to document services personally performed by Berry Fuller MD based on my observations and the provider's statements to me.    [employer]        Berry Fuller MD  05/15/24 145       Berry Fuller MD  05/15/24 1614

## 2024-05-15 NOTE — ED TRIAGE NOTES
Pt comes in by ambulance. Pt was at EMS station looking for his family member that works there. PD called because pt paranoid and delusional. Voluntarily requesting help. Calm and cooperative. No psych history. Denies drugs and alcohol use.

## 2024-05-15 NOTE — PHARMACY-ADMISSION MEDICATION HISTORY
Pharmacist Admission Medication History    Admission medication history is complete. The information provided in this note is only as accurate as the sources available at the time of the update.    Information Source(s): Patient, Hospital records, and CareEverywhere/SureScripts via in-person    Pertinent Information: Patient states that he takes no prescription or OTC medications.     Changes made to PTA medication list:  Added: None  Deleted: Somatropin  Changed: None    Allergies reviewed with patient and updates made in EHR: no    Medication History Completed By: Hemant Schmitt ELOISA 5/15/2024 4:53 PM    No outpatient medications have been marked as taking for the 5/15/24 encounter (Hospital Encounter).

## 2024-05-15 NOTE — CONSULTS
"Diagnostic Evaluation Consultation  Crisis Assessment    Patient Name: Clint Gay  Age:  29 year old  Legal Sex: male  Gender Identity: male  Pronouns:   Race: White  Ethnicity: Not  or   Language: English      Patient was assessed: Virtual: [employer] Crisis Assessment Start Time: 1508 Crisis Assessment Stop Time: 1547  Patient location: [facility] EMERGENCY DEPT                             ED05    Referral Data and Chief Complaint  Clint Gay presents to the ED via EMS. Patient is presenting to the ED for the following concerns: Paranoia, Depression, Anxiety, Significant behavioral change, Substance use.   Factors that make the mental health crisis life threatening or complex are:  Per EPIC note, \"Pt was at EMS station looking for his family member that works there. PD called because pt paranoid and delusional.\"  Pt reported he has been drinking alcohol too much lately.  Pt has limited coping skills, no family support, has no prescribed psychiatric medications and no current outpatient mental health service providers..      Informed Consent and Assessment Methods  Explained the crisis assessment process, including applicable information disclosures and limits to confidentiality, assessed understanding of the process, and obtained consent to proceed with the assessment.  Assessment methods included conducting a formal interview with patient, review of medical records, collaboration with medical staff, and obtaining relevant collateral information from family and community providers when available.  : done     Patient response to interventions: eager to participate, acceptance expressed, verbalizes understanding  Coping skills were attempted to reduce the crisis:  playing video games, basketball, watching TV, listening to music, drawing, deep breathing exercise, meditation.     History of the Crisis   Pt is a 29 year old White male with history of ADHD, depression, anxiety, " "psychosis, and MILI.  Pt was brought to the ER today by EMS due to worsening of paranoia and delusion.  Pt was not oriented to who, date, time and place.  Pt remarked, \"I am any losing my mind.\" as his reason for visiting the ER today.  When writer asked Pt to elaborate what he meant by losing his mind he shared he has been drinking too much lately.  Pt declined to identify frequency of drinking but reported his last drink was 3 to 4 days ago.  Pt reproted he was drinking some Tequila with 2 beers.  Pt also reported using THC about 1x weekly as he last use was 5 days ago.  Pt denied using other illicit substances.  Pt appeared to be guarded as he declined to answer some of his DEC assessment questions.  Pt identified recently losing his employment, worrying about housing and financial stress as triggers leading to his current mental health crisis.  Pt endorsed increased depression, isolation, worry, racing thoughts and anxiety.  Pt reported having poor sleep but normal appetite.  Pt endorsed paranoia as he felt someone was watching him and people were ought to get him.  Pt denied having auditory hallucination but endorsed visual hallucination of seeing people who were not there.  Pt denied having suicidal and homcidal ideations.  Pt denied having access to firearms, history of SIB and previous suicide attempt.    Brief Psychosocial History  Family:  Single, Children no  Support System:     Employment Status:  unemployed  Source of Income:  none  Financial Environmental Concerns:  unemployed  Current Hobbies:  sports/team sports, exercise/fitness, games, television/movies/videos, social media/computer activities, music, reading, writing/journaling/blogging, meditation, arts/crafts  Barriers in Personal Life:  mental health concerns, financial concerns, emotional concerns    Significant Clinical History  Current Anxiety Symptoms:  racing thoughts, excessive worry, anxious, panic attack  Current Depression/Trauma:  " apathy, helplessness, hopelessness, sadness, impaired decision making, withdrawl/isolation  Current Somatic Symptoms:  excessive worry, anxious, racing thoughts  Current Psychosis/Thought Disturbance:  visual hallucinations, impulsive  Current Eating Symptoms:     Chemical Use History:  Alcohol: Other (comments) (Pt reported drinking alcohol a lot lately but declined to identify frequency.)  Last Use:: 05/11/24  Benzodiazepines: None  Opiates: None  Cocaine: None  Marijuana: Other (comments) (Pt reported using THC about 1x weekly.)  Last Use:: 05/10/24  Other Use: None   Past diagnosis:  ADHD, Anxiety Disorder, Depression, Substance Use Disorder  Family history:  No known history of mental health or chemical health concerns  Past treatment:  Psychiatric Medication Management  Details of most recent treatment:  Pt has no recent treatment.  Pt has no current outpatient mental health service providers and has no prescribed psychiatric medications.  Other relevant history:  Pt reported his parents were  and his mom had passed away.  Pt reproted he was the only child and has no relationship with his dad as  they have not spoke to each other for many years.  Pt reported he was single, has no children and lived alone.  Pt reported recently losing his employment about 2 months ago as he has been looking for a new job.  Pt denied having medical conditions.  Pt reported history of fighting with  as his legal issue.  Pt denied history of being abused.       Collateral Information  Is there collateral information: No (Pt reported no family and emergency  for collateral.)     Collateral information name, relationship, phone number:       What happened today:       What is different about patient's functioning:       Concern about alcohol/drug use:      What do you think the patient needs:      Has patient made comments about wanting to kill themselves/others:      If d/c is recommended, can they take  part in safety/aftercare planning:       Additional collateral information:        Risk Assessment  [facility] Suicide Severity Rating Scale Full Clinical Version:  Suicidal Ideation  Q1 Wish to be Dead (Lifetime): No  Q2 Non-Specific Active Suicidal Thoughts (Lifetime): No  Q6 Suicide Behavior (Lifetime): no     Suicidal Behavior (Lifetime)  Actual Attempt (Lifetime): No  Has subject engaged in non-suicidal self-injurious behavior? (Lifetime): No  Interrupted Attempts (Lifetime): No  Aborted or Self-Interrupted Attempt (Lifetime): No  Preparatory Acts or Behavior (Lifetime): No    [facility] Suicide Severity Rating Scale Recent:   Suicidal Ideation (Recent)  Q1 Wished to be Dead (Past Month): no  Q2 Suicidal Thoughts (Past Month): no  Level of Risk per Screen: no risks indicated     Suicidal Behavior (Recent)  Actual Attempt (Past 3 Months): No  Has subject engaged in non-suicidal self-injurious behavior? (Past 3 Months): No  Interrupted Attempts (Past 3 Months): No  Aborted or Self-Interrupted Attempt (Past 3 Months): No  Preparatory Acts or Behavior (Past 3 Months): No    Environmental or Psychosocial Events: legal issues such as DWI, DUI, lawsuit, CPS involvement, etc., challenging interpersonal relationships, helplessness/hopelessness, impulsivity/recklessness, unemployment/underemployment, other life stressors, neither working nor attending school, recent life events (see comment), social isolation, ongoing abuse of substances, excessive debt, poor finances  Protective Factors: Protective Factors: lives in a responsibly safe and stable environment, help seeking, constructive use of leisure time, enjoyable activities, resilience    Does the patient have thoughts of harming others? Feels Like Hurting Others: no  Previous Attempt to Hurt Others: no  Current presentation:  (Pt was calm, alert, moderately engaged and cooperative.)  Is the patient engaging in sexually inappropriate behavior?: no    Is the patient  engaging in sexually inappropriate behavior?  no        Mental Status Exam   Affect: Flat  Appearance: Appropriate  Attention Span/Concentration: Attentive  Eye Contact: Engaged, Variable    Fund of Knowledge: Appropriate, Delayed   Language /Speech Content: Fluent  Language /Speech Volume: Normal  Language /Speech Rate/Productions: Normal  Recent Memory: Variable  Remote Memory: Variable  Mood: Anxious, Apathetic, Depressed, Sad  Orientation to Person: No   Orientation to Place: No  Orientation to Time of Day: No  Orientation to Date: No     Situation (Do they understand why they are here?): Yes  Psychomotor Behavior: Normal  Thought Content: Clear, Paranoia, Hallucinations  Thought Form: Intact, Paranoia     Mini-Cog Assessment  Number of Words Recalled:    Clock-Drawing Test:     Three Item Recall:    Mini-Cog Total Score:       Medication  Psychotropic medications:   Medication Orders - Psychiatric (From admission, onward)      Start     Dose/Rate Route Frequency Ordered Stop    05/15/24 1649  hydrOXYzine HCl (ATARAX) tablet 25 mg         25 mg Oral EVERY 4 HOURS PRN 05/15/24 1650      05/15/24 1434  OLANZapine zydis (zyPREXA) ODT tab 5 mg         5 mg Oral EVERY 6 HOURS PRN 05/15/24 1434               Current Care Team  Patient Care Team:  Ramirez King MD as PCP - General (Pediatrics)    Diagnosis  Patient Active Problem List   Diagnosis Code    Severe major depression with psychotic features (H) F32.3    SORIN (generalized anxiety disorder) F41.1    Attention deficit hyperactivity disorder F90.9    Alcohol abuse F10.10    Cannabis use, unspecified, uncomplicated F12.90       Primary Problem This Admission  Active Hospital Problems    Severe major depression with psychotic features (H)      SORIN (generalized anxiety disorder)      Alcohol abuse      Attention deficit hyperactivity disorder      Cannabis use, unspecified, uncomplicated        Clinical Summary and Substantiation of Recommendations   Pt  presenting in the ER today due to worsening of paranoia and delusion.  Pt appeared to be guarded due to his paranoia.  Pt was not oriented to person, place, time and date.  Pt shared he has been drinking a lot lately but declined to identify frequency.  Pt reported using THC but denied other illicit substances.  Pt identified recently losing his employment, worrying about housing and financial stress as triggers leading to his current mental health crisis. Pt endorsed increased depression, isolation, worry, racing thoughts and anxiety. Pt reported having poor sleep but normal appetite. Pt endorsed paranoia as he felt someone was watching him and people were ought to get him. Pt denied having auditory hallucination but endorsed visual hallucination of seeing people who were not there. Pt denied having suicidal and homcidal ideations. Pt denied having access to firearms, history of SIB and previous suicide attempt.  Pt was not able to engage in his DEC safety plan as he did not feel safe to return home.  Pt was not able to elaborate why he did not feel safe to return home.  Pt has limited coping skills, impaired judgment and acute paranoia.  Pt has limited support system, has no outpatient mental health service providers and no prescibed psychiatric medications.  Pt was appropriate for inpatient psychiatric service for further stabilization, safety assessment and medication management.          Severe psychiatric, behavioral or other comorbid conditions are appropriate for management at inpatient mental health as indicated by at least one of the following: Psychiatric Symptoms, Comorbid substance use disorder, Impaired impulse control, judgement, or insight, Symptoms of impact to function  Severe dysfunction in daily living is present as indicated by at least one of the following: Complete inability to maintain any appropriate aspect of personal responsibility in any adult roles, Other evidence of severe  dysfunction  Situation and expectations are appropriate for inpatient care: Voluntary treatment at lower level of care is not feasible, Patient management/treatment at lower level of care is not feasible or is inappropriate, Biopsychosocial stresses potentially contributing to clinical presentation (co morbidities) have been assessed and are absent or manageable at proposed level of care  Inpatient mental health services are necessary to meet patient needs and at least one of the following: Specific condition related to admission diagnosis is present and judged likely to further improve at proposed level of care, Specific condition related to admission diagnosis is present and judged likely to deteriorate in absence of treatment at proposed level of care      Patient coping skills attempted to reduce the crisis:  playing video games, basketball, watching TV, listening to music, drawing, deep breathing exercise, meditation.    Disposition  Recommended disposition: Inpatient Mental Health        Reviewed case and recommendations with attending provider. Attending Name: Dr. Fuller       Attending concurs with disposition: yes       Patient and/or validated legal guardian concurs with disposition:   yes       Final disposition:  inpatient mental health    Legal status on admission: Voluntary/Patient has signed consent for treatment    Assessment Details   Total duration spent with the patient: 39 min     CPT code(s) utilized: 26312 - Psychotherapy for Crisis - 60 (30-74*) min    Mateo Wright [facility] Center[facility]ITHYA, Psychotherapist  DEC - Triage & Transition Services  Callback: [phone]

## 2024-05-15 NOTE — TELEPHONE ENCOUNTER
S: [facility] ED , DEC  Mateo   calling at 4:02 PM   about a 29 year old/Male presenting with Paranoia      B: Pt arrived via EMS. Presenting problem, stressors: Increasing paranoia, VH , depression and anxiety.     Pt affect in ED: Anxious , Depressed, and Flat  Pt Dx: Major Depressive Disorder, Generalized Anxiety Disorder, and Unspecified Psychosis  Previous IPMH hx? No  Pt denies SI   Hx of suicide attempt? No  Pt denies SIB  Pt denies HI   Pt endorses visual hallucination .   Pt RARS Score: 2    Hx of aggression/violence, sexual offenses, legal concerns, Epic care plan? describe: History of aggression with law enforcement   Current concerns for aggression this visit? No  Does pt have a history of Civil Commitment? No  Is Pt their own guardian? Yes    Pt is not prescribed medication. Is patient medication compliant? No  Pt denies OP services   CD concerns: Actively using/consuming Alcohol, THC   Acute or chronic medical concerns: NO   Does Pt present with specific needs, assistive devices, or exclusionary criteria? None      Pt is ambulatory  Pt is able to perform ADLs independently      A: Pt to be reviewed for [facility] admission. Pt is Voluntary  Preferred placement: [facility]    COVID Symptoms: No  If yes, COVID test required   Utox: Ordered, not yet collected   CMP: Ordered, not yet collected   CBC: Ordered, not yet collected   HCG: N/A    R: Patient cleared and ready for behavioral bed placement: Yes  Pt placed on [facility] worklist? Yes    Does Patient need a Transfer Center request created? Yes, writer completed Transfer Center request at: 4:13 PM    R: [facility] Access Inpatient Bed Call Log  5/15/2024 4:15PM  Intake has called facilities that have not updated their bed status within the last 12 hours.        ADULTS:    *[facility]  [place] -- [facility]: @ cap per website.  [place] -- [facility]:  @ cap per website.   [place] -- [facility]: @ Cap per website.  [place] -- [facility]: @ cap per website.    [place] -- [facility]: @ Cap per website.  [facility] -- [facility]: @ cap per website.   [place] -- [facility] beds Posting 2 Beds Ages 18-28, Voluntary only, COVID test req'd, No aggression, physical or sexual assault, violence hx or drug abuse, or psychosis.  PT IS NOT AGE APPROPRIATE   [place] -- [facility]: @ Cap per website.  [place] -- [facility]: @ cap per website.  [place] -- [facility]:  @ cap per website.. Low acuity.    Pt remains on waitlist pending appropriate placement availability.

## 2024-05-15 NOTE — ED NOTES
Writer and security searched patient using wand, collected belt, wallet, vape, and shoes. Items were labeled and placed in bag and secured in DEC office.

## 2024-05-15 NOTE — ED NOTES
Clint MACDONALD Deidra  May 15, 2024  Plan of Care Hand-off Note     Patient Care Path: inpatient mental health    Plan for Care:   Pt presenting in the ER today due to worsening of paranoia and delusion.  Pt appeared to be guarded due to his paranoia.  Pt was not oriented to person, place, time and date.  Pt shared he has been drinking a lot lately but declined to identify frequency.  Pt reported using THC but denied other illicit substances.  Pt identified recently losing his employment, worrying about housing and financial stress as triggers leading to his current mental health crisis. Pt endorsed increased depression, isolation, worry, racing thoughts and anxiety. Pt reported having poor sleep but normal appetite. Pt endorsed paranoia as he felt someone was watching him and people were ought to get him. Pt denied having auditory hallucination but endorsed visual hallucination of seeing people who were not there. Pt denied having suicidal and homcidal ideations. Pt denied having access to firearms, history of SIB and previous suicide attempt.  Pt was not able to engage in his DEC safety plan as he did not feel safe to return home.  Pt was not able to elaborate why he did not feel safe to return home.  Pt has limited coping skills, impaired judgment and acute paranoia.  Pt has limited support system, has no outpatient mental health service providers and no prescibed psychiatric medications.  Pt was appropriate for inpatient psychiatric service for further stabilization, safety assessment and medication management.  EC order was made for further therapeutic support and follow up with Pt while on boarding.    Identified Goals and Safety Issues: Pt denied having suicidal and homicidal ideations.  Pt reported not feeling safe at home due to paranoia and visual hallucination.    Overview:          Only allow calls and visits from designated visitors and care team members    Legal Status: Legal Status at Admission:  Voluntary/Patient has signed consent for treatment    Psychiatry Consult:       Updated   regarding plan of care.           Mateo Wright, NIRUSW

## 2024-05-16 ENCOUNTER — TELEPHONE (OUTPATIENT)
Dept: BEHAVIORAL HEALTH | Facility: CLINIC | Age: 29
End: 2024-05-16
Payer: COMMERCIAL

## 2024-05-16 LAB
FLUAV RNA SPEC QL NAA+PROBE: NEGATIVE
FLUBV RNA RESP QL NAA+PROBE: NEGATIVE
RSV RNA SPEC NAA+PROBE: NEGATIVE
SARS-COV-2 RNA RESP QL NAA+PROBE: NEGATIVE

## 2024-05-16 PROCEDURE — 87637 SARSCOV2&INF A&B&RSV AMP PRB: CPT | Performed by: STUDENT IN AN ORGANIZED HEALTH CARE EDUCATION/TRAINING PROGRAM

## 2024-05-16 NOTE — TELEPHONE ENCOUNTER
R: [place]  Access Inpatient Bed Call Log  5/16/2024 12:02 AM  Intake has called facilities that have not updated their bed status within the last 12 hours.??      ADULTS:     *METRO  [place] -- [facility]: @ cap per website.  [place] -- [facility]:  @ cap per website.  [place] -- [facility]: @ Cap per website.  [place] -- [facility]: @ cap per website.  [place] -- [facility]: @ Cap per website.  [facility] -- [facility]: @ cap per website.  [place] -- [facility]/YA beds @Posting 2 beds. Ages 18-28, Voluntary only, COVID test req'd, NO aggression, physical or sexual assault, violence hx or drug abuse, or psychosis. Per Agatha @ 12:37AM, no YA beds tonight  [place] -- [facility]: @ Cap per website.  [place] -- [facility]: @ cap per website.  [place] -- [facility]:  @ Posting 1 bed. Per Ely @ 12:55AM, no beds but suggests calling back at 1AM. Called again @ 1:03AM and Latesha reports they only have low acuity beds but she is reviewing other referral    Pt remains on waitlist pending appropriate placement availability.

## 2024-05-16 NOTE — TELEPHONE ENCOUNTER
R:    8:02a Called [facility]/YISEL Viera to inquire about ability to review pt.  informed to fax clinical.    8:07a Called [facility] ED Nurse to put pt's order for covid lab on their radar. Nurse informed they know the lab is needed and will collect once pt is up for the day.     8:19a Current clinical faxed to  for review of pt, awaiting covid lab to fax. Awaiting response from .     9:34a Received call from  Reena inquiring about notes stating pt is drinking, wanting to know what they are drinking, how much, and how often are they drinking? If there are any symptoms of withdrawal? There is also mention of hx of aggression with law enforcement, needing more information such as how long ago and the nature of the encounter.    9:40a Called [facility] ED Nurse Enzo for 's inquiry:     inquiring about notes stating pt is drinking, wanting to know what they are drinking, how much, and how often are they drinking? If there are any symptoms of withdrawal? There is also mention of hx of aggression with law enforcement, needing more information such as how long ago and the nature of the encounter.    Nurse will inquire with pt and CB Intake.     11:08a Called [facility] to speak with Nurse. Intake spoke with [facility] and informed needed information about pt for PC to further review pt for admission. [facility] will connect with pt's Nurse and have Nurse CB Intake.     11:31a Called  Reena to inform still awaiting response from ED Nurse.     11:55a Called [facility] ED Nurse who informed that pt drinks 4 days a week, as low as 5 and as high as 8 beers at a time. No seizure hx, no active withdrawal but Nurse notes pt is feeling a little anxious and paranoid. Nurse informed about law enforcement incident, pt was tackled by law enforcement and so pt tackled them back but no further clarification in regard to incident.    11:57a Called  Maximiliano to inform:    [facility] ED Nurse who informed that pt drinks 4 days a week, as low as 5 and as  high as 8 beers at a time. No seizure hx, no active withdrawal but Nurse notes pt is feeling a little anxious and paranoid. Nurse informed about law enforcement incident, pt was tackled by law enforcement and so pt tackled them back but no further clarification in regard to incident.     Maximiliano will inform YISEL Valles.    12:01p Covid lab faxed to . Awaiting response.     12:26p Received call from YISEL Valles, Intake notes 11:55a conversation with ED Nurse.  is wanting further information on this encounter.     12:34p Called [facility] to speak with pt's MITZI Howard will CB Intake.     R: Reena at  called at 1:36 pm stating pt was declined due to needing mhICU level of care which they do not provide. Author informed Jennifer in intake.

## 2024-05-16 NOTE — ED NOTES
"When I asked the Pt about his drinking habits, he stated \"about 5-8 beers, around 4 days a week.\"   "

## 2024-05-16 NOTE — ED NOTES
IP MH Referral Acuity Rating Score (RARS)    LMHP complete at referral to IP MH, with DEC; and, daily while awaiting IP MH placement. Call score to PPS.  CRITERIA SCORING   New 73 HH and Involuntary for IP MH (not adolescent) 0/1   Boarding over 24 hours 1/1   Vulnerable adult at least 55+ with multiple co morbidities; or, Patient age 11 or under 0/1   Suicide ideation without relief of precipitating factors 0/1   Current plan for suicide 0/1   Current plan for homicide 0/1   Imminent risk or actual attempt to seriously harm another without relief of factors precipitating the attempt 0/1   Severe dysfunction in daily living (ex: complete neglect for self care, extreme disruption in vegetative function, extreme deterioration in social interactions) 1/1   Recent (last 2 weeks) or current physical aggression in the ED 0/1   Restraints or seclusion episode in ED 0/1   Verbal aggression, agitation, yelling, etc., while in the ED 0/1   Active psychosis with psychomotor agitation or catatonia 1/1   Need for constant or near constant redirection (from leaving, from others, etc).  0/1   Intrusive or disruptive behaviors 0/1   TOTAL Acuity Total Score: 3

## 2024-05-16 NOTE — ED NOTES
Pt has been calm, pleasant,  and appropriate throughout shift. Able to walk with a steady gait. Breakfast and lunch have been delivered.

## 2024-05-16 NOTE — TELEPHONE ENCOUNTER
R: [place] ONLY    [facility] Access Inpatient Bed Call Log 5/16/2024  @ 7:05 am:      Intake has called facilities that have not updated their bed status within the last 12 hours.       ADULTS:               [facility] is posting 0 beds.                           [facility] is posting 0 beds. [phone]; per call at 7:06 am to Vaishnavi, they are at cap.                [facility] is posting 0 beds. [phone]                           [facility] is posting 0 beds. [phone]; per call at 7:08 am to Ritika, they are at cap.                [facility] is posting 0 beds. [phone]                [facility] (These are Young Adult beds, 18-28) is posting 2 beds. Negative COVID test required, no recent or significant aggression, violence, or sexual assault. [phone]; per call at 7:09 pm to Aida, they have a couple y/a beds, a couple child beds, a handful of adol s, no singles, and no yanni beds avail.                [facility] is posting 0 beds. [phone]                         [facility] through [facility] is posting 0 beds. [phone]           Pt remains on work list pending appropriate bed availability.

## 2024-05-16 NOTE — ED NOTES
Patient requested nicotine replacement but when offered, patient declined. Patient also offered PRN medications but patient was anxious about taking medications and declined.

## 2024-05-16 NOTE — ED NOTES
RN ED Mental Health Handoff Note    CHALO    Does patient require 1:1? Yes    Hold and rights been given and documented for patient: Yes    Is the patient in BH scrubs? No -In street clothes, has been searched    Has the patient been searched? Yes    Is the 15 minute observation tool up to date? Yes    Was patient issued a welcome folder? Yes    Room check completed this shift: Yes    PSS3 and [place] Assessment/Reassessment this shift:    C-SSRS ([place])      Date and Time Q1 Wished to be Dead (Past Month) Q2 Suicidal Thoughts (Past Month) Q3 Suicidal Thought Method Q4 Suicidal Intent without Specific Plan Q5 Suicide Intent with Specific Plan Q6 Suicide Behavior (Lifetime) Within the Past 3 Months? Level of Risk per Screen Level of Risk per Screen User   05/15/24 1649 0-->no 0-->no -- -- -- -- -- -- no risks indicated PSR   05/15/24 1648 -- -- -- -- -- 0-->no -- -- -- PSR   05/15/24 1352 0-->no 0-->no -- -- -- 0-->no -- -- no risks indicated AJS            Behavioral status of patient: Green    Code 21 called this shift? No    Use of restraints/seclusion this shift? No    Most recent vital signs:  Temp: 99.2  F (37.3  C)   BP: 138/86 Pulse: 106   Resp: 18 SpO2: 99 % O2 Device: None (Room air)      Medications:  Scheduled medication compliance? Yes    PRN Meds administered this shift? No    Medications   OLANZapine zydis (zyPREXA) ODT tab 5 mg (has no administration in time range)   hydrOXYzine HCl (ATARAX) tablet 25 mg (has no administration in time range)   nicotine (NICODERM CQ) 21 MG/24HR 24 hr patch 1 patch (has no administration in time range)         ADLs    Meal Provided this shift? Yes    Hygiene items provided? Yes    ADLs completed? Yes    Date of last shower: Prior to arrival    Any significant events this shift? No    Any information that would be helpful in caring for this patient?  Patient remains quite fearful and paranoid regarding medical interventions such as lab work, medication, etc. Patient  also is noted to have a delay in response while answering questions. Patient does well when reoriented to reality and has also been observed requesting to shake hands with staff to reorient to reality.     Family present/updated? No    Location of patient's belongings: DEC office    Critical Care Minutes:  Does the patient need critical care minutes documented? No

## 2024-05-16 NOTE — ED NOTES
RN ED Mental Health Handoff Note    Voluntary    Does patient require 1:1? Yes    Hold and rights been given and documented for patient: Yes    Is the patient in BH scrubs? No - street clothes, has been searched.    Has the patient been searched? Yes    Is the 15 minute observation tool up to date? Yes    Was patient issued a welcome folder? Yes    Room check completed this shift: Yes    PSS3 and [place] Assessment/Reassessment this shift:    C-SSRS ([place])      Date and Time Q1 Wished to be Dead (Past Month) Q2 Suicidal Thoughts (Past Month) Q3 Suicidal Thought Method Q4 Suicidal Intent without Specific Plan Q5 Suicide Intent with Specific Plan Q6 Suicide Behavior (Lifetime) Within the Past 3 Months? Level of Risk per Screen Level of Risk per Screen User   05/15/24 1649 0-->no 0-->no -- -- -- -- -- -- no risks indicated PSR   05/15/24 1648 -- -- -- -- -- 0-->no -- -- -- PSR   05/15/24 1352 0-->no 0-->no -- -- -- 0-->no -- -- no risks indicated AJS            Behavioral status of patient: Green    Code 21 called this shift? No    Use of restraints/seclusion this shift? No    Most recent vital signs:  Temp: 99.2  F (37.3  C)   BP: 138/86 Pulse: 106   Resp: 18 SpO2: 99 % O2 Device: None (Room air)      Medications:  Scheduled medication compliance? Yes    PRN Meds administered this shift? No    Medications   OLANZapine zydis (zyPREXA) ODT tab 5 mg (has no administration in time range)   hydrOXYzine HCl (ATARAX) tablet 25 mg (has no administration in time range)   nicotine (NICODERM CQ) 21 MG/24HR 24 hr patch 1 patch (has no administration in time range)         ADLs    Meal Provided this shift? No    Hygiene items provided? Yes    ADLs completed? No    Date of last shower: PTA    Any significant events this shift? No    Any information that would be helpful in caring for this patient?  Pt remains fearful & paranoid regarding medical interventions. Does well when reoriented to reality.     Family present/updated?  No    Location of patient's belongings: DEC office    Critical Care Minutes:  Does the patient need critical care minutes documented? No

## 2024-05-16 NOTE — ED NOTES
RN ED Mental Health Handoff Note    Voluntary, but holdable.     Does patient require 1:1? No    Hold and rights been given and documented for patient: Yes    Is the patient in BH scrubs? No -street clothes.     Has the patient been searched? Yes    Is the 15 minute observation tool up to date? Yes    Was patient issued a welcome folder? Yes    Room check completed this shift: Yes    PSS3 and [place] Assessment/Reassessment this shift:    C-SSRS ([place])      Date and Time Q1 Wished to be Dead (Past Month) Q2 Suicidal Thoughts (Past Month) Q3 Suicidal Thought Method Q4 Suicidal Intent without Specific Plan Q5 Suicide Intent with Specific Plan Q6 Suicide Behavior (Lifetime) Within the Past 3 Months? Level of Risk per Screen Level of Risk per Screen User   05/15/24 1649 0-->no 0-->no -- -- -- -- -- -- no risks indicated PSR   05/15/24 1648 -- -- -- -- -- 0-->no -- -- -- PSR   05/15/24 1352 0-->no 0-->no -- -- -- 0-->no -- -- no risks indicated AJS            Behavioral status of patient: Green    Code 21 called this shift? No    Use of restraints/seclusion this shift? No    Most recent vital signs:      BP: 110/87 Pulse: 110   Resp: 20 SpO2: 98 % O2 Device: None (Room air)      Medications:  Scheduled medication compliance? Yes    PRN Meds administered this shift? No    Medications   OLANZapine zydis (zyPREXA) ODT tab 5 mg (has no administration in time range)   hydrOXYzine HCl (ATARAX) tablet 25 mg (has no administration in time range)   nicotine (NICODERM CQ) 21 MG/24HR 24 hr patch 1 patch (has no administration in time range)         ADLs    Meal Provided this shift? Yes    Hygiene items provided? Yes    ADLs completed? Yes    Date of last shower:     Any significant events this shift? No    Any information that would be helpful in caring for this patient?      Family present/updated? Yes    Location of patient's belongings:     Critical Care Minutes:  Does the patient need critical care minutes documented?  No

## 2024-05-17 ENCOUNTER — TELEPHONE (OUTPATIENT)
Dept: BEHAVIORAL HEALTH | Facility: CLINIC | Age: 29
End: 2024-05-17
Payer: COMMERCIAL

## 2024-05-17 NOTE — ED NOTES
RN ED Mental Health Handoff Note    Voluntary - holdable    Does patient require 1:1? Yes    Hold and rights been given and documented for patient: No    Is the patient in BH scrubs? No -street clothes    Has the patient been searched? Yes    Is the 15 minute observation tool up to date? Yes    Was patient issued a welcome folder? Yes    Room check completed this shift: Yes    PSS3 and [place] Assessment/Reassessment this shift:    C-SSRS ([place])      Date and Time Q1 Wished to be Dead (Past Month) Q2 Suicidal Thoughts (Past Month) Q3 Suicidal Thought Method Q4 Suicidal Intent without Specific Plan Q5 Suicide Intent with Specific Plan Q6 Suicide Behavior (Lifetime) Within the Past 3 Months? Level of Risk per Screen Level of Risk per Screen User   05/15/24 1649 0-->no 0-->no -- -- -- -- -- -- no risks indicated PSR   05/15/24 1648 -- -- -- -- -- 0-->no -- -- -- PSR   05/15/24 1352 0-->no 0-->no -- -- -- 0-->no -- -- no risks indicated AJS            Behavioral status of patient: Green    Code 21 called this shift? No    Use of restraints/seclusion this shift? No    Most recent vital signs:      BP: 116/86 Pulse: 97   Resp: 18 SpO2: 99 % O2 Device: None (Room air)      Medications:  Scheduled medication compliance? N/a    PRN Meds administered this shift? No    Medications   OLANZapine zydis (zyPREXA) ODT tab 5 mg (has no administration in time range)   hydrOXYzine HCl (ATARAX) tablet 25 mg (has no administration in time range)   nicotine (NICODERM CQ) 21 MG/24HR 24 hr patch 1 patch (has no administration in time range)         ADLs    Meal Provided this shift? Yes    Hygiene items provided? Yes    ADLs completed? Yes    Date of last shower: pta    Any significant events this shift? No    Any information that would be helpful in caring for this patient?  N/a    Family present/updated? No    Location of patient's belongings: remain with pt    Critical Care Minutes:  Does the patient need critical care minutes  documented? No

## 2024-05-17 NOTE — ED NOTES
Pt has been calm and cooperative throughout the shift.  Pt was taken to the shower per his request and a new set of hygiene items was given to the pt as he stated he did not have a toothbrush.  Pt received his meals and ate appropriately. He continues to await inpt psych bed.

## 2024-05-17 NOTE — TELEPHONE ENCOUNTER
R: [place]  Access Inpatient Bed Call Log  5/17/2024 12:00 AM  Intake has called facilities that have not updated their bed status within the last 12 hours.??      ADULTS:     *METRO  [place] -- [facility]: @ cap per website.  [place] -- [facility]:  @ cap per website.  [place] -- [facility]: @ Cap per website.  [place] -- [facility]: @ cap per website. -Per Petar @ 12:03AM, no beds tonight  [place] -- [facility]: @ Cap per website.  [facility]: @ cap per website.  [place] -- [facility]/YA beds @Posting 4 beds. Ages 18-28, Voluntary only, COVID test req'd, NO aggression, physical or sexual assault, violence hx or drug abuse, or psychosis. -Per Terence @ 12:04AM, few YA beds available. Declined 5/16 d/t needing MHICU level of care, which they do not provide  [place] -- [facility]: @ Cap per website.  [place] -- [facility]: @ cap per website.  [place] -- [facility]:  @ Posting 1 bed. -Per Tali @ 12:11AM, they are at full capacity     Pt remains on waitlist pending appropriate placement availability.

## 2024-05-17 NOTE — PROGRESS NOTES
Triage & Transition Services, Extended Care     Therapy Progress Note    Patient: Clint   Date of Service: May 17, 2024  Site of Service: [facility] EMERGENCY DEPT                             ED05  Patient was seen yes  Mode of Assessment: In person    Presentation Summary: Writer entered the patient's room, introduced self and explained role.  Patient was lying down on his side on the gurney watching TV.  Patient sat up to speak with writer, he presents as cordial and polite.  Patient said he feels safer in the context of being in the hospital, he feels if he were to leave, he would begin to feel unsafe again.  He feels people are threatening him and out to get him, he believes people are trying to follow him and find him.  Patient paused to call his mother to consult with her.  Patient's mother relayed support for IP MH admission.  Patient continues to agree with IP MH admission and is voluntary at this time. Patient denies substance use, he endorses drinking alcohol, he said he typically has a few shots and some beer or seltzers.  Reviewed plan of care with the patient and explained what to expect with IP MH admission, patient verbalizes understanding.  Patient  seemingly lacks insight into symptoms, he re    Therapeutic Intervention(s) Provided: Reviewed healthy living that supports positive mental health, including looking at sleep hygiene, regular movement, nutrition, and regular socialization., Explored motivation for behavioral change.    Current Symptoms: racing thoughts, anxious sense of doom, difficulty concentrating, impaired decision making racing thoughts, anxious        Mental Status Exam   Affect: Blunted  Appearance: Appropriate  Attention Span/Concentration: Attentive  Eye Contact: Engaged, Variable    Fund of Knowledge: Appropriate   Language /Speech Content: Fluent  Language /Speech Volume: Soft  Language /Speech Rate/Productions: Normal  Recent Memory: Variable  Remote Memory:  "Variable  Mood: Anxious, Apathetic, Depressed, Sad  Orientation to Person: Yes   Orientation to Place: Yes  Orientation to Time of Day: No  Orientation to Date: No     Situation (Do they understand why they are here?): Yes  Psychomotor Behavior: Normal  Thought Content: Paranoia, Hallucinations  Thought Form: Paranoia    Treatment Objective(s) Addressed: rapport building, orienting the patient to therapy, processing feelings, safety planning, assessing safety    Patient Response to Interventions: acceptance expressed, verbalizes understanding    Progress Towards Goals: Patient Reports Symptoms Are: ongoing  Patient Progress Toward Goals: is making progress  Comment: Patient's behavior while in ED has been calm and cooperative  Next Step to Work Toward Discharge: symptom stabilization, patient ability to engage in safety planning, engaging in safety planning with collateral sources      Plan: inpatient mental health  yes provider, RN MUKESH Fuller, KENIA BURDEN  yes    Clinical Substantiation: Continue to recommend IP MH admission for further safety and stabilization.  Patient continues to present as fearful, depressed, with racing thoughts, and paranoia.  Patient is voluntary for IP MH admission, per initial consult \"Pt presenting in the ER today due to worsening of paranoia and delusion. Pt appeared to be guarded due to his paranoia. Pt was not oriented to person, place, time and date. Pt shared he has been drinking a lot lately but declined to identify frequency. Pt reported using THC but denied other illicit substances. Pt identified recently losing his employment, worrying about housing and financial stress as triggers leading to his current mental health crisis. Pt endorsed increased depression, isolation, worry, racing thoughts and anxiety. Pt reported having poor sleep but normal appetite. Pt endorsed paranoia as he felt someone was watching him and people were ought to get him. Pt denied having auditory " "hallucination but endorsed visual hallucination of seeing people who were not there. Pt denied having suicidal and homcidal ideations. Pt denied having access to firearms, history of SIB and previous suicide attempt. Pt was not able to engage in his DEC safety plan as he did not feel safe to return home.\"    Legal Status: Legal Status at Admission: Voluntary/Patient has signed consent for treatment    Session Status: Time session started: 1200  Time session ended: 1227  Session Duration (minutes): 27 minutes  Session Number: 1  Anticipated number of sessions or this episode of care: 4    Time Spent: 27 minutes    CPT Code: CPT Codes: 02583 - Psychotherapy (with patient) - 30 (16-37*) min    Diagnosis:   Patient Active Problem List   Diagnosis Code    Severe major depression with psychotic features (H) F32.3    SORIN (generalized anxiety disorder) F41.1    Attention deficit hyperactivity disorder F90.9    Alcohol abuse F10.10    Cannabis use, unspecified, uncomplicated F12.90       Primary Problem This Admission:       Severe major depression with psychotic features (H) F32.3      SORIN (generalized anxiety disorder) F41.1      Attention deficit hyperactivity disorder F90.9      Alcohol abuse F10.1      Cannabis use, unspecified, uncomplicated F12.90    [facility]   Licensed Mental Health Professional (LMHP), Extended Care  [phone]         "

## 2024-05-17 NOTE — TELEPHONE ENCOUNTER
R:  No current bed availability for review for [facility] @ [facility].   [place] bed search completed @ 4:30pm:     [place] -- [facility]:  @ cap per website.   [place] -- [facility]: @ Cap per website.  [place] -- [facility]: @ cap per website.   [place] -- [facility]: @ Cap per website.  [facility] -- [facility]: @ cap per website.   [facility]/ beds Posting 3 Beds Ages 18-28, Voluntary only, COVID test req'd, No aggression, physical or sexual assault, violence hx or drug abuse, or psychosis.  Called and NO Beds are available  [place] -- [facility]: @ Cap per website.  [place] -- [facility]: @ cap per website.  [place] -- [facility]:  @ CaPACITYLow acuity.    Pt to remain on adult worklist pending bed availability

## 2024-05-17 NOTE — TELEPHONE ENCOUNTER
R:  [place]  Access Inpatient Bed Call Log 5/17/2024 7:40 AM   Intake has called facilities that have not updated their bed status within the last 12 hours.     ADULTS:   [facility] is posting 0 beds.               [facility] is posting 0 beds. [phone];    [facility] is posting 0 beds. [phone]               [facility] is posting 0 beds. [phone]; Per call at 7:49am to Ritika currently at capacity can check back after 9:30am.   [facility] is posting 0 beds. [phone]    [facility] (These are Young Adult beds, 18-28) is posting 4 beds. Negative COVID test required, no recent or significant aggression, violence, or sexual assault. [phone]; Per call at 7:50am to [facility] beds are available. 5/16- Pt declined d/t needing MHICU bed.   [facility] is posting 0 beds. [phone]             [facility] is posting 1 beds. [phone] 9:21 AM Per martin Castillo currently at capacity. Call back in an hour after reassessed. [place] @ 12:18 PM still at capacity. 1:26 PM reevaluating tomorrow morning.      Pt remains on work list pending appropriate bed availability.

## 2024-05-17 NOTE — ED NOTES
RN ED Mental Health Handoff Note    Voluntary    Does patient require 1:1? No    Hold and rights been given and documented for patient: No    Is the patient in  scrubs? Yes    Has the patient been searched? Yes    Is the 15 minute observation tool up to date? Yes    Was patient issued a welcome folder? Yes    Room check completed this shift: No    PSS3 and [place] Assessment/Reassessment this shift:    C-SSRS ([place])      Date and Time Q1 Wished to be Dead (Past Month) Q2 Suicidal Thoughts (Past Month) Q3 Suicidal Thought Method Q4 Suicidal Intent without Specific Plan Q5 Suicide Intent with Specific Plan Q6 Suicide Behavior (Lifetime) Within the Past 3 Months? Level of Risk per Screen Level of Risk per Screen User   05/15/24 1649 0-->no 0-->no -- -- -- -- -- -- no risks indicated PSR   05/15/24 1648 -- -- -- -- -- 0-->no -- -- -- PSR   05/15/24 1352 0-->no 0-->no -- -- -- 0-->no -- -- no risks indicated AJS            Behavioral status of patient: Green    Code 21 called this shift? No    Use of restraints/seclusion this shift? No    Most recent vital signs:      BP: 116/86 Pulse: 97   Resp: 18 SpO2: 99 %        Medications:  Scheduled medication compliance? Yes    PRN Meds administered this shift? No    Medications   OLANZapine zydis (zyPREXA) ODT tab 5 mg (has no administration in time range)   hydrOXYzine HCl (ATARAX) tablet 25 mg (has no administration in time range)   nicotine (NICODERM CQ) 21 MG/24HR 24 hr patch 1 patch (has no administration in time range)         ADLs    Meal Provided this shift? Yes    Hygiene items provided? Yes    ADLs completed? Yes    Date of last shower: 5/17/24    Any significant events this shift? No    Any information that would be helpful in caring for this patient?  Pt is calm and cooperative    Family present/updated? Yes    Location of patient's belongings: pt states he has some belongings in the DEC office.    Critical Care Minutes:  Does the patient need critical care  minutes documented? No

## 2024-05-18 ENCOUNTER — TELEPHONE (OUTPATIENT)
Dept: BEHAVIORAL HEALTH | Facility: CLINIC | Age: 29
End: 2024-05-18
Payer: COMMERCIAL

## 2024-05-18 NOTE — ED NOTES
Pt continues to sleep without significant event since care hand-off. Calm at the moment. Will continue monitor

## 2024-05-18 NOTE — ED NOTES
RN ED Mental Health Handoff Note    Voluntary    Does patient require 1:1? No    Hold and rights been given and documented for patient: No    Is the patient in  scrubs? Yes    Has the patient been searched? Yes    Is the 15 minute observation tool up to date? Yes    Was patient issued a welcome folder? Yes    Room check completed this shift: Yes    PSS3 and [place] Assessment/Reassessment this shift:    C-SSRS ([place])      Date and Time Q1 Wished to be Dead (Past Month) Q2 Suicidal Thoughts (Past Month) Q3 Suicidal Thought Method Q4 Suicidal Intent without Specific Plan Q5 Suicide Intent with Specific Plan Q6 Suicide Behavior (Lifetime) Within the Past 3 Months? Level of Risk per Screen Level of Risk per Screen User   05/15/24 1649 0-->no 0-->no -- -- -- -- -- -- no risks indicated PSR   05/15/24 1648 -- -- -- -- -- 0-->no -- -- -- PSR   05/15/24 1352 0-->no 0-->no -- -- -- 0-->no -- -- no risks indicated AJS            Behavioral status of patient: Green    Code 21 called this shift? No    Use of restraints/seclusion this shift? No    Most recent vital signs:      BP: 116/85 Pulse: 97   Resp: 16 SpO2: 100 % O2 Device: None (Room air)      Medications:  Scheduled medication compliance? No, VIKI meds given    PRN Meds administered this shift? No    Medications   OLANZapine zydis (zyPREXA) ODT tab 5 mg (has no administration in time range)   hydrOXYzine HCl (ATARAX) tablet 25 mg (has no administration in time range)   nicotine (NICODERM CQ) 21 MG/24HR 24 hr patch 1 patch (1 patch Transdermal Not Given 5/17/24 5447)         ADLs    Meal Provided this shift? Yes    Hygiene items provided? Yes    ADLs completed? Yes    Date of last shower: today    Any significant events this shift? No    Any information that would be helpful in caring for this patient?  N/a    Family present/updated? No    Location of patient's belongings: DEC office    Critical Care Minutes:  Does the patient need critical care minutes documented?  No

## 2024-05-18 NOTE — TELEPHONE ENCOUNTER
R:  [place]  Access Inpatient Bed Call Log 5/18/2024 @3:20 PM   Intake has called facilities that have not updated their bed status within the last 12 hours.     ADULTS:     [facility] ONLY   [facility] is posting 0 beds.               [facility] is posting 0 beds. [phone]; per call at 7:07 am to Rebecca, they are at cap.    [facility] is posting 0 beds. [phone]               [facility] is posting 0 beds. [phone]; =   [facility] is posting [facility] beds.    [facility] is posting 0 beds. [phone]    [facility] (These are Young Adult beds, 18-28) is posting 1 bed. Negative COVID test required, no recent or significant aggression, violence, or sexual assault. [phone]; DECLINED D/T NEEDING HIGHER LEVEL OF CARE  [facility] is posting 0 beds. [phone]             [facility] is posting 1 bed. [phone]         Pt remains on work list pending appropriate bed availability.

## 2024-05-18 NOTE — ED NOTES
Patient thought he needed to be reassessed  for being admitted for inpatient. Spoke with DEC he has an assessment yesterday and  ws told they would work on placement. Patient did not understand that this would not take  place til Monday. Patient  stated he understand now and is willing to stay.

## 2024-05-18 NOTE — TELEPHONE ENCOUNTER
R:  No current bed availability within [facility] @ 7:14am.   Pt is voluntary and wants Metro bed placement only.     Bed search initiated @ 7:20am:    [facility] is posting 0 beds. [phone]   [facility] is posting 0 beds. [phone]              [facility] is posting 0 beds. [phone].  [facility] is posting 0 beds. [phone]   [facility] (These are Young Adult beds, 18-28) is posting 1 bed. Negative COVID test required, no recent or significant aggression, violence, or sexual assault. [phone] PC already reviewed and states pt needs a higher level of care than they can provide r/t psychosis.     [facility] is posting 0 beds. [phone]            [facility] is posting 0 beds. [phone]     [facility] is posting 0 beds. [phone]     No Bed availability in the Metro.  Pt to remain on the Adult worklist pending bed availability for review for IPMH.

## 2024-05-18 NOTE — TELEPHONE ENCOUNTER
R:  [place] MH Access Inpatient Bed Call Log 5/18/2024 12:00 AM   Intake has called facilities that have not updated their bed status within the last 12 hours.       [facility] is posting 0 beds.               [facility] is posting 0 beds. [phone]. Called @ 12:12AM, no answer and call ended in busy tone/unable to leave    [facility] is posting 0 beds. [phone]               [facility] is posting 0 beds. [phone]. Per Nikhil Bermduez @ 12:18AM, low acuity bed    [facility] is posting 0 beds. [phone]    [facility] (These are Young Adult beds, 18-28) is posting 3 beds. Negative COVID test required, no recent or significant aggression, violence, or sexual assault. [phone]. Declined 5/16 d/t needing MHICU level of care, which they do not provide  [facility] is posting 0 beds. [phone]             [facility] is posting 1 bed. [phone]. Per Tali @ 12:19AM, they are at capacity            Pt remains on work list pending appropriate bed availability.

## 2024-05-18 NOTE — ED NOTES
RN ED Mental Health Handoff Note    Voluntary    Does patient require 1:1? No    Hold and rights been given and documented for patient: No    Is the patient in  scrubs? Yes    Has the patient been searched? Yes    Is the 15 minute observation tool up to date? Yes    Was patient issued a welcome folder? Yes    Room check completed this shift: Yes    PSS3 and [place] Assessment/Reassessment this shift:    C-SSRS ([place])      Date and Time Q1 Wished to be Dead (Past Month) Q2 Suicidal Thoughts (Past Month) Q3 Suicidal Thought Method Q4 Suicidal Intent without Specific Plan Q5 Suicide Intent with Specific Plan Q6 Suicide Behavior (Lifetime) Within the Past 3 Months? Level of Risk per Screen Level of Risk per Screen User   05/15/24 1649 0-->no 0-->no -- -- -- -- -- -- no risks indicated PSR   05/15/24 1648 -- -- -- -- -- 0-->no -- -- -- PSR   05/15/24 1352 0-->no 0-->no -- -- -- 0-->no -- -- no risks indicated AJS            Behavioral status of patient: Green    Code 21 called this shift? No    Use of restraints/seclusion this shift? No    Most recent vital signs:      BP: 116/85 Pulse: 97   Resp: 16 SpO2: 100 % O2 Device: None (Room air)      Medications:  Scheduled medication compliance? No    PRN Meds administered this shift? No    Medications   OLANZapine zydis (zyPREXA) ODT tab 5 mg (has no administration in time range)   hydrOXYzine HCl (ATARAX) tablet 25 mg (has no administration in time range)   nicotine (NICODERM CQ) 21 MG/24HR 24 hr patch 1 patch (1 patch Transdermal Not Given 5/17/24 3077)         ADLs    Meal Provided this shift? No    Hygiene items provided? No    ADLs completed? No    Date of last shower: PTA     Any significant events this shift? No    Any information that would be helpful in caring for this patient?  NA    Family present/updated? No    Location of patient's belongings: DEC office     Critical Care Minutes:  Does the patient need critical care minutes documented? No

## 2024-05-18 NOTE — ED NOTES
Pt calm and pleasant during shift. States is paranoid at times but the security guards help him be more at ease.

## 2024-05-18 NOTE — ED NOTES
RN ED Mental Health Handoff Note    Voluntary    Does patient require 1:1? No    Hold and rights been given and documented for patient: No    Is the patient in  scrubs? Yes    Has the patient been searched? Yes    Is the 15 minute observation tool up to date? Yes    Was patient issued a welcome folder? Yes    Room check completed this shift: Yes    PSS3 and [place] Assessment/Reassessment this shift:    C-SSRS ([place])      Date and Time Q1 Wished to be Dead (Past Month) Q2 Suicidal Thoughts (Past Month) Q3 Suicidal Thought Method Q4 Suicidal Intent without Specific Plan Q5 Suicide Intent with Specific Plan Q6 Suicide Behavior (Lifetime) Within the Past 3 Months? Level of Risk per Screen Level of Risk per Screen User   05/15/24 1649 0-->no 0-->no -- -- -- -- -- -- no risks indicated PSR   05/15/24 1648 -- -- -- -- -- 0-->no -- -- -- PSR   05/15/24 1352 0-->no 0-->no -- -- -- 0-->no -- -- no risks indicated AJS            Behavioral status of patient: Green    Code 21 called this shift? No    Use of restraints/seclusion this shift? No    Most recent vital signs:      BP: 116/85 Pulse: 97   Resp: 16 SpO2: 100 % O2 Device: None (Room air)      Medications:  Scheduled medication compliance? Yes    PRN Meds administered this shift? Yes    Medications   OLANZapine zydis (zyPREXA) ODT tab 5 mg (has no administration in time range)   hydrOXYzine HCl (ATARAX) tablet 25 mg (has no administration in time range)   nicotine (NICODERM CQ) 21 MG/24HR 24 hr patch 1 patch (1 patch Transdermal Not Given 5/18/24 1155)         ADLs    Meal Provided this shift? Yes    Hygiene items provided? No    ADLs completed? No    Date of last shower: PTA    Any significant events this shift? No    Any information that would be helpful in caring for this patient?  Patient updated that no placement would happen  until  Monday per DEC. Patient states he understands and still wishes to stay voluntary for inpatient placement  Family  present/updated? No    Location of patient's belongings: Dec office phone at bedside    Critical Care Minutes:  Does the patient need critical care minutes documented? No

## 2024-05-19 ENCOUNTER — TELEPHONE (OUTPATIENT)
Dept: BEHAVIORAL HEALTH | Facility: CLINIC | Age: 29
End: 2024-05-19
Payer: COMMERCIAL

## 2024-05-19 NOTE — TELEPHONE ENCOUNTER
R:  [place]  Access Inpatient Bed Call Log 5/19/2024 @ 12:20AM   Intake has called facilities that have not updated their bed status within the last 12 hours.    [facility]: @ cap  [facility]: @ cap per website. Per Temo @ 12:20AM, all patients have to go through their APS/No external reviews  [facility]: @ cap per website   [facility]: @ cap per website. Per Nikhil Bermudez @ 12:22AM, they only have 1 bed and are already reviewing a referral  [facility]: @ cap per website   [facility]: @ cap per website   [facility]: @ cap per website (Young Adult unit: No recent aggressions, violence or sexual assault. Neg covid required). Declined 5/16 d/t needing MHICU level of care, which they do not provide     [facility]: @ cap per website   [facility]: @ cap per website   [facility]: Posting 1 bed. Per Tali @ 12:24AM, they are at full capacity     Pt remains on work list until appropriate placement is available

## 2024-05-19 NOTE — TELEPHONE ENCOUNTER
R: [place]  Access Inpatient Bed Call Log  5/18/2024 4:30 PM  Intake has called facilities that have not updated their bed status within the last 12 hours.        ADULTS:    MET[facility]     *METRO  [place] -- [facility]: @ cap per website.  [place] -- [facility]:  @ cap per website.   [place] -- [facility]: @ Cap per website.  [place] -- [facility]: @ cap per website.   [place] -- [facility]: @ Cap per website.  [facility]: @ cap per website.   [facility]/ beds @ cap per website. Ages 18-28, Voluntary only, COVID test req'd, No aggression, physical or sexual assault, violence hx or drug abuse, or psychosis.    [place] -- [facility]: @ Cap per website.  [place] -- [facility]: @ cap per website.  [place] -- [facility]:  @ Posting 1 Bed Low acuity.      Pt remains on waitlist pending appropriate placement availability.

## 2024-05-19 NOTE — ED NOTES
Pt's sister and father here with clean clothes and food for pt., bags searched by writer.    Woodrow Xavier RN

## 2024-05-19 NOTE — ED NOTES
Patient in bed watching tv. States he is anxious to be transferred but understands the delay.  Given snacks and drinks. Denies additional needs.

## 2024-05-19 NOTE — ED NOTES
RN ED Mental Health Handoff Note    Voluntary    Does patient require 1:1? No    Hold and rights been given and documented for patient: No    Is the patient in  scrubs? Yes    Has the patient been searched? Yes    Is the 15 minute observation tool up to date? Yes    Was patient issued a welcome folder? Yes    Room check completed this shift: Yes    PSS3 and [place] Assessment/Reassessment this shift:    C-SSRS ([place])      Date and Time Q1 Wished to be Dead (Past Month) Q2 Suicidal Thoughts (Past Month) Q3 Suicidal Thought Method Q4 Suicidal Intent without Specific Plan Q5 Suicide Intent with Specific Plan Q6 Suicide Behavior (Lifetime) Within the Past 3 Months? Level of Risk per Screen Level of Risk per Screen User   05/15/24 1649 0-->no 0-->no -- -- -- -- -- -- no risks indicated PSR   05/15/24 1648 -- -- -- -- -- 0-->no -- -- -- PSR   05/15/24 1352 0-->no 0-->no -- -- -- 0-->no -- -- no risks indicated AJS            Behavioral status of patient: Green    Code 21 called this shift? No    Use of restraints/seclusion this shift? No    Most recent vital signs:                       Medications:  Scheduled medication compliance? Yes    PRN Meds administered this shift? Yes    Medications   OLANZapine zydis (zyPREXA) ODT tab 5 mg (has no administration in time range)   hydrOXYzine HCl (ATARAX) tablet 25 mg (has no administration in time range)   nicotine (NICODERM CQ) 21 MG/24HR 24 hr patch 1 patch (1 patch Transdermal Not Given 5/18/24 1425)         ADLs    Meal Provided this shift? Yes    Hygiene items provided? No    ADLs completed? No    Date of last shower: unknown    Any significant events this shift? No    Any information that would be helpful in caring for this patient?      Family present/updated? No    Location of patient's belongings: in room    Critical Care Minutes:  Does the patient need critical care minutes documented? No

## 2024-05-19 NOTE — TELEPHONE ENCOUNTER
R: [place]  Access Inpatient Bed Call Log 5/19/2024 2:41 PM   Intake has called facilities that have not updated their bed status within the last 12 hours.??         *[facility] -- [facility]: @ cap per website.   [place] -- [facility]:  @ cap per website. [phone] ECT Tx offered.   [place] -- [facility]: Posting 0 beds. ECT Tx offered.        [place] -- [facility]:  @ cap per website.   [place] -- [facility]: Posting 0 beds.   [facility] -- [facility]: Posting 0 beds. [phone] ECT Tx offered.   [place] -- [facility] Posting 0 beds. Pt must be 16-28  [place] -- [facility] Posting 0 beds. [phone]     [place] -- [facility]: @ cap per website.   [place] -- [facility]:  Posting 1 bed. Low acuity only. [phone].         Pt remains on worklist pending appropriate bed availability.

## 2024-05-19 NOTE — CONSULTS
5/15/2024  Clint Gay 1995     Writer consulted with ED RN,  on this date at 8:46 PM. It was determined that pt would not benefit from assessment at this time due to pt no longer requesting DEC reassessment and is willing to wait until an inpt bed becomes available.    DEC order has been closed at this time.    Jennifer Fall, LICSW

## 2024-05-20 ENCOUNTER — TELEPHONE (OUTPATIENT)
Dept: BEHAVIORAL HEALTH | Facility: CLINIC | Age: 29
End: 2024-05-20
Payer: COMMERCIAL

## 2024-05-20 ENCOUNTER — HOSPITAL ENCOUNTER (INPATIENT)
Facility: HOSPITAL | Age: 29
LOS: 1 days | Discharge: HOME | End: 2024-05-21
Attending: STUDENT IN AN ORGANIZED HEALTH CARE EDUCATION/TRAINING PROGRAM | Admitting: STUDENT IN AN ORGANIZED HEALTH CARE EDUCATION/TRAINING PROGRAM
Payer: COMMERCIAL

## 2024-05-20 VITALS
HEART RATE: 81 BPM | TEMPERATURE: 98.1 F | SYSTOLIC BLOOD PRESSURE: 115 MMHG | DIASTOLIC BLOOD PRESSURE: 90 MMHG | RESPIRATION RATE: 16 BRPM | OXYGEN SATURATION: 100 %

## 2024-05-20 PROBLEM — F22 PARANOIA (H): Status: ACTIVE | Noted: 2024-05-20

## 2024-05-20 PROCEDURE — 250N000013 HC RX MED GY IP 250 OP 250 PS 637: Performed by: EMERGENCY MEDICINE

## 2024-05-20 PROCEDURE — 250N000013 HC RX MED GY IP 250 OP 250 PS 637: Performed by: NURSE PRACTITIONER

## 2024-05-20 PROCEDURE — 124N000001 HC R&B MH

## 2024-05-20 RX ORDER — OLANZAPINE 10 MG/1
10 TABLET ORAL 3 TIMES DAILY PRN
Status: DISCONTINUED | OUTPATIENT
Start: 2024-05-20 | End: 2024-05-21 | Stop reason: HOSPADM

## 2024-05-20 RX ORDER — LANOLIN ALCOHOL/MO/W.PET/CERES
3 CREAM (GRAM) TOPICAL
Status: DISCONTINUED | OUTPATIENT
Start: 2024-05-20 | End: 2024-05-21 | Stop reason: HOSPADM

## 2024-05-20 RX ORDER — HYDROXYZINE HYDROCHLORIDE 25 MG/1
25 TABLET, FILM COATED ORAL EVERY 4 HOURS PRN
Status: DISCONTINUED | OUTPATIENT
Start: 2024-05-20 | End: 2024-05-21 | Stop reason: HOSPADM

## 2024-05-20 RX ORDER — MAGNESIUM HYDROXIDE/ALUMINUM HYDROXICE/SIMETHICONE 120; 1200; 1200 MG/30ML; MG/30ML; MG/30ML
30 SUSPENSION ORAL EVERY 4 HOURS PRN
Status: DISCONTINUED | OUTPATIENT
Start: 2024-05-20 | End: 2024-05-21 | Stop reason: HOSPADM

## 2024-05-20 RX ORDER — OLANZAPINE 10 MG/2ML
10 INJECTION, POWDER, FOR SOLUTION INTRAMUSCULAR 3 TIMES DAILY PRN
Status: DISCONTINUED | OUTPATIENT
Start: 2024-05-20 | End: 2024-05-21 | Stop reason: HOSPADM

## 2024-05-20 RX ORDER — ACETAMINOPHEN 325 MG/1
650 TABLET ORAL EVERY 4 HOURS PRN
Status: DISCONTINUED | OUTPATIENT
Start: 2024-05-20 | End: 2024-05-21

## 2024-05-20 RX ADMIN — NICOTINE POLACRILEX 4 MG: 2 GUM, CHEWING ORAL at 08:06

## 2024-05-20 RX ADMIN — NICOTINE POLACRILEX 2 MG: 2 GUM, CHEWING ORAL at 12:59

## 2024-05-20 RX ADMIN — OLANZAPINE 10 MG: 10 TABLET, FILM COATED ORAL at 12:54

## 2024-05-20 ASSESSMENT — ACTIVITIES OF DAILY LIVING (ADL)
ADLS_ACUITY_SCORE: 35
ADLS_ACUITY_SCORE: 35
ADLS_ACUITY_SCORE: 28
ADLS_ACUITY_SCORE: 45
ADLS_ACUITY_SCORE: 45
ADLS_ACUITY_SCORE: 28
ADLS_ACUITY_SCORE: 35
ADLS_ACUITY_SCORE: 28
ADLS_ACUITY_SCORE: 35
ADLS_ACUITY_SCORE: 28
ADLS_ACUITY_SCORE: 35
ADLS_ACUITY_SCORE: 28
ADLS_ACUITY_SCORE: 35

## 2024-05-20 ASSESSMENT — LIFESTYLE VARIABLES: SKIP TO QUESTIONS 9-10: 0

## 2024-05-20 NOTE — ED NOTES
EMS here for patient, patient informed. Patient signed paperwork without issue. Patient encouraged to use the restroom.

## 2024-05-20 NOTE — MEDICATION SCRIBE - ADMISSION MEDICATION HISTORY
Patient denies taking any prescribed medications, nor is supposed to be taking any prescribed medications, at this time (scheduled and/or PRN).     Patient denies taking any OTC vitamins, supplements or analgesics.     Patient reports the following OTC products:    No conflicting data from any available outside sources.

## 2024-05-20 NOTE — PLAN OF CARE
Face to face end of shift report received from day RN. Rounding completed. Patient observed in his room sleeping in bed.     Catarino Olivares, RN  5/20/2024  [id number]    Patient spent entire shift resting/sleeping in bed. Came out to lounge to eat dinner. Had HS snack in his room. Denies SI/HI/AVH/depression/anxiety/pain. No PRN medications administered. Appetite/hygiene good.    Report will be given to night RN at change of shift.    Problem: Adult Behavioral Health Plan of Care  Goal: Patient-Specific Goal (Individualization)  Description: Patient will sleep 6 to 8 hours per night  Patient will eat at least 50% of meals  Patient will attend at least 50% of groups  Patient will comply with recommendations of treatment team  Patient will remain medication compliant      Outcome: Progressing     Problem: Thought Process Alteration  Goal: Optimal Thought Clarity  Description: Patient will be able to have a reality based conversation by discharge.   Outcome: Progressing

## 2024-05-20 NOTE — TELEPHONE ENCOUNTER
00:22 - Called SEBASTIAN Francisco to request review  01:02 - [place] PEGGY Matos called to report that pt was accepted to [address] [place] [address]. RN report at any time  Placed pt in queue    R: [address] [place] [address] / Shannon    01:08 - Notified ED FLACA valencia
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
## 2024-05-20 NOTE — PROGRESS NOTES
05/20/24 1309   Patient Belongings   Did you bring any home meds/supplements to the hospital?  No   Patient Belongings locker;sent to security per site process   Patient Belongings Put in Hospital Secure Location (Security or Locker, etc.) bracelet;necklace;shoes;clothing;purse/wallet;cash/credit card;cell phone/electronics   Belongings Search Yes   Clothing Search Yes   Second Staff Lina   Comment [employer]idas blue unide, grey,green,orange 3 pairs of American Egale undies, tan flannel American Egale shirt, grey American Egale long sleeve, American Egale pair of jeans, 7 pairs of socks, 5 white tank tops, grey pair of shorts, tan adidas hoodie, grey adidas tshirt, white american egale tshirt, black tshirt, american egale camo jogger pair of pants, tan twins hat, 2 white tees, 2 white long sleeves, tolietress, tejada ranch sunflower seeds, black charging ear plug/other one is missing, , black adida pair of shoes, blue vape, , black pair of sunglasses, tan carhart hat, grey underarmor hoodie, grey american egale pair of joggers, small can of pop, small thing of gold fish.     List items sent to safe: Black iphone w/couple scratches, brown wallet, 2 debt cards, [place] card, roadside card, health insurance card and miscellaneous cards, cross necklace and flag bracelet, $5.52 change in total.     All other belongings put in assigned cubby in belongings room.       I have reviewed my belongings list on admission and verify that it is correct.     Patient signature_______________________________    Second staff witness (if patient unable to sign) ______________________________       I have received all my belongings at discharge.    Patient signature________________________________    [place]   5/20/2024  1:30 PM

## 2024-05-20 NOTE — TELEPHONE ENCOUNTER
11:15 PM Intake received a call from RN at [facility] - Pt is willing to go anywhere including [place].     11:16 PM Updated [facility]

## 2024-05-20 NOTE — ED NOTES
RN ED Mental Health Handoff Note    Voluntary    Does patient require 1:1? No    Hold and rights been given and documented for patient: No    Is the patient in  scrubs? Yes    Has the patient been searched? Yes    Is the 15 minute observation tool up to date? Yes    Was patient issued a welcome folder? Yes    Room check completed this shift: Yes    PSS3 and [place] Assessment/Reassessment this shift:    C-SSRS ([place])      Date and Time Q1 Wished to be Dead (Past Month) Q2 Suicidal Thoughts (Past Month) Q3 Suicidal Thought Method Q4 Suicidal Intent without Specific Plan Q5 Suicide Intent with Specific Plan Q6 Suicide Behavior (Lifetime) Within the Past 3 Months? Level of Risk per Screen Level of Risk per Screen User   05/15/24 1649 0-->no 0-->no -- -- -- -- -- -- no risks indicated PSR   05/15/24 1648 -- -- -- -- -- 0-->no -- -- -- PSR   05/15/24 1352 0-->no 0-->no -- -- -- 0-->no -- -- no risks indicated AJS            Behavioral status of patient: Green    Code 21 called this shift? No    Use of restraints/seclusion this shift? No    Most recent vital signs:  Temp: 98.1  F (36.7  C) Temp src: Oral BP: 120/82 Pulse: 75   Resp: 16 SpO2: 98 % O2 Device: None (Room air)      Medications:  Scheduled medication compliance? N/a    PRN Meds administered this shift? No    Medications   OLANZapine zydis (zyPREXA) ODT tab 5 mg (has no administration in time range)   hydrOXYzine HCl (ATARAX) tablet 25 mg (has no administration in time range)   nicotine (NICORETTE) gum 2 mg (has no administration in time range)         ADLs    Meal Provided this shift? Yes    Hygiene items provided? No    ADLs completed? No    Date of last shower: unknown    Any significant events this shift? No    Any information that would be helpful in caring for this patient?  Accepted to [facility] [address] [place] [address] ([phone])    Family present/updated? Yes    Location of patient's belongings: in room    Critical Care Minutes:  Does the patient need  critical care minutes documented? No

## 2024-05-20 NOTE — ED NOTES
Pt using his phone and resting quietly on bed. Meal tray cleared from room and soda provided. Calm and cooperative.

## 2024-05-20 NOTE — LETTER
[facility]  [address] [place] [address]  Phone: [phone]  Fax: [phone]    05/21/24    Clint Gay  [address] [facility] [address]      To whom it may concern:     Please excuse Clint Gay as he was under my care 5/20/2024-5/21/2024.      Sincerely,        CARIN Daugherty CNP

## 2024-05-21 VITALS
TEMPERATURE: 97.3 F | HEART RATE: 61 BPM | SYSTOLIC BLOOD PRESSURE: 103 MMHG | OXYGEN SATURATION: 99 % | BODY MASS INDEX: 17.53 KG/M2 | WEIGHT: 111.7 LBS | DIASTOLIC BLOOD PRESSURE: 66 MMHG | RESPIRATION RATE: 16 BRPM | HEIGHT: 67 IN

## 2024-05-21 LAB
ALBUMIN SERPL BCG-MCNC: 4.2 G/DL (ref 3.5–5.2)
ALP SERPL-CCNC: 76 U/L (ref 40–150)
ALT SERPL W P-5'-P-CCNC: 1138 U/L (ref 0–70)
ANION GAP SERPL CALCULATED.3IONS-SCNC: 8 MMOL/L (ref 7–15)
AST SERPL W P-5'-P-CCNC: 841 U/L (ref 0–45)
BILIRUB SERPL-MCNC: 0.9 MG/DL
BUN SERPL-MCNC: 9.2 MG/DL (ref 6–20)
CALCIUM SERPL-MCNC: 9.6 MG/DL (ref 8.6–10)
CHLORIDE SERPL-SCNC: 105 MMOL/L (ref 98–107)
CREAT SERPL-MCNC: 0.84 MG/DL (ref 0.67–1.17)
DEPRECATED HCO3 PLAS-SCNC: 31 MMOL/L (ref 22–29)
EGFRCR SERPLBLD CKD-EPI 2021: >90 ML/MIN/1.73M2
GLUCOSE SERPL-MCNC: 79 MG/DL (ref 70–99)
HOLD SPECIMEN: NORMAL
POTASSIUM SERPL-SCNC: 4.1 MMOL/L (ref 3.4–5.3)
PROT SERPL-MCNC: 6.3 G/DL (ref 6.4–8.3)
SODIUM SERPL-SCNC: 144 MMOL/L (ref 135–145)

## 2024-05-21 PROCEDURE — 99222 1ST HOSP IP/OBS MODERATE 55: CPT | Performed by: NURSE PRACTITIONER

## 2024-05-21 PROCEDURE — 250N000013 HC RX MED GY IP 250 OP 250 PS 637: Performed by: NURSE PRACTITIONER

## 2024-05-21 PROCEDURE — 86705 HEP B CORE ANTIBODY IGM: CPT | Performed by: NURSE PRACTITIONER

## 2024-05-21 PROCEDURE — 86803 HEPATITIS C AB TEST: CPT | Performed by: NURSE PRACTITIONER

## 2024-05-21 PROCEDURE — 80053 COMPREHEN METABOLIC PANEL: CPT | Performed by: NURSE PRACTITIONER

## 2024-05-21 PROCEDURE — 36415 COLL VENOUS BLD VENIPUNCTURE: CPT | Performed by: NURSE PRACTITIONER

## 2024-05-21 PROCEDURE — 99236 HOSP IP/OBS SAME DATE HI 85: CPT | Mod: AI

## 2024-05-21 RX ORDER — IBUPROFEN 600 MG/1
600 TABLET, FILM COATED ORAL EVERY 6 HOURS PRN
Status: DISCONTINUED | OUTPATIENT
Start: 2024-05-21 | End: 2024-05-21 | Stop reason: HOSPADM

## 2024-05-21 RX ADMIN — NICOTINE POLACRILEX 2 MG: 2 GUM, CHEWING ORAL at 07:31

## 2024-05-21 ASSESSMENT — ACTIVITIES OF DAILY LIVING (ADL)
ADLS_ACUITY_SCORE: 28
HYGIENE/GROOMING: INDEPENDENT
LAUNDRY: UNABLE TO COMPLETE
ADLS_ACUITY_SCORE: 28
DRESS: SCRUBS (BEHAVIORAL HEALTH);INDEPENDENT
ADLS_ACUITY_SCORE: 28
ADLS_ACUITY_SCORE: 28
ORAL_HYGIENE: INDEPENDENT
ADLS_ACUITY_SCORE: 28

## 2024-05-21 NOTE — DISCHARGE INSTRUCTIONS
Behavioral Discharge Planning and Instructions    Summary: 29-year-old pt with a hx of ETOH use disorder who presented to [facility] ED 5/16/24 with paranoia and potential delusions over several days prior to arrival.     Main Diagnosis:     Health Care Follow-up:     [facility]  5/22@ 2:20pm with Love Palomares  [address]   [place], [address]  [phone]  F:[phone]    Resources     [facility]  [address] [place] [address] [phone]    [facility]  [address] [place] [address] [phone]  Psychiatry and therapy available here.     [facility]   [address] [place], [place] [address] [phone]  Psychiatry and therapy available here.     [facility]  [address], [place] [address] [phone]  Psychiatry and therapy available here.     Attend all scheduled appointments with your outpatient providers. Call at least 24 hours in advance if you need to reschedule an appointment to ensure continued access to your outpatient providers.     Major Treatments, Procedures and Findings:  You were provided with: a psychiatric assessment, assessed for medical stability, medication evaluation and/or management, group therapy, family therapy, individual therapy, CD evaluation/assessment, milieu management, and medical interventions    Symptoms to Report: feeling more aggressive, increased confusion, losing more sleep, mood getting worse, or thoughts of suicide    Early warning signs can include: increased depression or anxiety sleep disturbances increased thoughts or behaviors of suicide or self-harm  increased unusual thinking, such as paranoia or hearing voices    Safety and Wellness:  Take all medicines as directed.  Make no changes unless your doctor suggests them.      Follow treatment recommendations.  Refrain from alcohol  "and non-prescribed drugs.  Ask your support system to help you reduce your access to items that could harm yourself or others. Items could include:  Firearms  Medicines (both prescribed and over-the-counter)  Knives and other sharp objects  Ropes and like materials  Car keys  If there is a concern for safety, call 911. If there is a concern for safety, call 911.    Resources:   Crisis Intervention: 959.584.3537 or 054-974-8326 (TTY: 266.663.9232).  Call anytime for help.  National [place] on Mental Illness (www.[place].nani.org): 703.972.9538 or 154-961-3516.  [place] Association for Children's Mental Health (www.mac.org): 628.138.1847.  Alcoholics Anonymous (www.alcoholics-anonymous.org): Check your phone book for your local chapter.  Suicide Awareness Voices of Education (SAVE) (www.save.org): 587-219-AJUS (5922)  National Suicide Prevention Line (www.mentalhealthmn.org): 289-613-PNBD (6032)  Mental Health Consumer/Survivor Network of [place] (www.mhcsn.net): 384.212.1021 or 312-693-5752  Mental Health Association of [place] (www.mentalhealth.org): 961.708.5296 or 641-724-2415  Self- Management and Recovery Training., SMART-- Toll free: 670.296.7887  www.[employer].Diplopia  Text 4 Life: txt \"LIFE\" to 86407 for immediate support and crisis intervention  Crisis text line: Text \"[place]\" to 690463. Free, confidential, 24/7.  Crisis Intervention: 852.435.7407 or 152-533-7318. Call anytime for help.     General Medication Instructions:   See your medication sheet(s) for instructions.   Take all medicines as directed.  Make no changes unless your doctor suggests them.   Go to all your doctor visits.  Be sure to have all your required lab tests. This way, your medicines can be refilled on time.  Do not use any drugs not prescribed by your doctor.  Avoid alcohol.    Advance Directives:   Scanned document on file with [facility]? No scanned doc  Is document scanned? Pt states no documents  Honoring Choices Your Rights Handout: Informed and " given  Was more information offered? Pt declined    The Treatment team has appreciated the opportunity to work with you. If you have any questions or concerns about your recent admission, you can contact the unit which can receive your call 24 hours a day, 7 days a week. They will be able to get in touch with a Provider if needed. The unit number is [phone] .

## 2024-05-21 NOTE — PLAN OF CARE
Problem: Adult Behavioral Health Plan of Care  Goal: Patient-Specific Goal (Individualization)  Description: Patient will sleep 6 to 8 hours per night  Patient will eat at least 50% of meals  Patient will attend at least 50% of groups  Patient will comply with recommendations of treatment team  Patient will remain medication compliant    Outcome: Progressing    Loud on the phone at the start of this shift. He is redirectable when told he needs to lower his voice in the lounge. Tells this write he just didn't think that the hospital was going to be like this and that he would prefer to get outpatient help as he doesn't think he should be here. Signed 12 hour intent to leave and provider aware. States he just needs something PRN for anxiety and panic attacks as opposed to something daily. Denies depression SI HI pain and hallucinations.     Problem: Thought Process Alteration  Goal: Optimal Thought Clarity  Description: Patient will be able to have a reality based conversation by discharge.   Outcome: Progressing     Face to face report given with opportunity to observe patient. Patient in lounge.     Report given to evening shift RN.     Yokasta Jiang RN   5/21/2024  7:33 AM

## 2024-05-21 NOTE — PROGRESS NOTES
Pt is discharging at the recommendation of the treatment team. Pt is discharging to home transported by parents. Pt denies having any thoughts of hurting themself or anyone else. Pt denies anxiety or depression. Pt has follow up with PCP. Discharge instructions, including; demographic sheet, psychiatric evaluation, discharge summary, and AVS were faxed to these next level of care providers.

## 2024-05-21 NOTE — H&P
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 care/goals.  Preparation of discharge records.  Preparation of medications refills and new prescriptions.  Preparation of applicable referral forms.        ATTESTATION      CARIN Daugherty CNP

## 2024-05-21 NOTE — H&P
"[facility]    History and Physical  Medical Services       Date of Admission:  5/20/2024  Date of Service (when I saw the patient): 05/21/24    Assessment & Plan     Principal Problem:    Paranoia (H)    Active Medical Problems:  Elevated liver enzymes- in the ED on 5/15  and . Repeat Cmp today showed critical levels ALT 1,138 and . He reports drinking 2-4 beers a couple days a week and states \"I might have drank a little more the night before I went to the ED, maybe some shots\". He denies abdominal pain, nausea, diarrhea, no jaundice noted. He is very dismissive and states \"I don't care and just want to get the fuck out of here\". Explained all the risks and that Hepatitis B and C labs were ordered. He initially declines and then after encouragement agrees to allow labs. He refuses any further workup. He states he will follow up with his pcp. Pmhnp to call mom and find out who his pcp is so we can get him an appointment to have his liver function checked. Explained to him to return to the ED for abdominal pain, nausea, vomiting, jaundice.     Code Status: Full Code    Elva Nicole, CNP    Primary Care Physician   Physician No Ref-Primary    Chief Complaint   Psych evaluation     History is obtained from the patient and electronic health record    History of Present Illness   (Per ED) Clint Gay is a very pleasant 29 year old male presenting with psychiatric evaluation. Patient notes he has been \"going crazy\" the past couple days and reports increased paranoia.  Per EMS, patient was at the EMS station looking for his family member who works and police were called because the patient was paranoid and delusional.  Clint notes that he thinks people are threatening him. He has not had negative interactions with anyone and does not have specific examples. Patient does not feel safe. Patient denies SI, HI, hallucinations, or drug use. Patient notes a history of psychosis. Clint " denies history of similar symptoms. He endorses slight alcohol use yesterday. Denies drug use.  He did drink alcohol yesterday but no significant amount.     Past Medical History    I have reviewed this patient's medical history and updated it with pertinent information if needed.   No past medical history on file.    Past Surgical History   I have reviewed this patient's surgical history and updated it with pertinent information if needed.  No past surgical history on file.    Prior to Admission Medications   None     Allergies   Allergies   Allergen Reactions    Cefprozil     Amoxicillin Unknown     STATES MEDICATION DOES NOT WORK       Social History   I have reviewed this patient's social history and updated it with pertinent information if needed. Clint MACDONALD Deidra  reports that he has been smoking. He has never used smokeless tobacco.    Family History   I have reviewed this patient's family history and updated it with pertinent information if needed.   Family History   Problem Relation Age of Onset    No Known Problems Mother     No Known Problems Father     No Known Problems Maternal Grandmother     No Known Problems Maternal Grandfather     No Known Problems Paternal Grandmother     No Known Problems Paternal Grandfather     No Known Problems Brother     No Known Problems Sister     No Known Problems Son     No Known Problems Daughter     No Known Problems Other     No Known Problems Sister     Unknown/Adopted No family hx of     Depression No family hx of     Anxiety Disorder No family hx of     Schizophrenia No family hx of     Bipolar Disorder No family hx of     Suicide No family hx of     Substance Abuse No family hx of     Dementia No family hx of     Tejas Disease No family hx of     Parkinsonism No family hx of     Autism Spectrum Disorder No family hx of     Intellectual Disability No family hx of     Mental Illness No family hx of        Review of Systems   CONSTITUTIONAL:  negative  EYES:   negative  HEENT:  negative  RESPIRATORY:  negative  CARDIOVASCULAR:  negative  GASTROINTESTINAL:  negative  GENITOURINARY:  negative  INTEGUMENT/BREAST:  negative  HEMATOLOGIC/LYMPHATIC:  negative  ALLERGIC/IMMUNOLOGIC:  negative  ENDOCRINE:  negative  MUSCULOSKELETAL:  negative  NEUROLOGICAL:  negative    Physical Exam   Temp: 97.3  F (36.3  C) Temp src: Temporal BP: 103/66 Pulse: 61   Resp: 16 SpO2: 99 % O2 Device: None (Room air)    Vital Signs with Ranges  Temp:  [97.3  F (36.3  C)] 97.3  F (36.3  C)  Pulse:  [61] 61  Resp:  [16] 16  BP: (103)/(66) 103/66  SpO2:  [99 %] 99 %  111 lbs 11.2 oz    Constitutional: awake, alert, cooperative, no apparent distress, and appears stated age, vitals stable   Eyes: Lids and lashes normal, pupils equal, round and reactive to light, extra ocular muscles intact, sclera clear, conjunctiva normal  ENT: Normocephalic, without obvious abnormality, atraumatic, external ears without lesions, oral pharynx with moist mucous membranes, no erythema or exudates  Hematologic / Lymphatic: no cervical lymphadenopathy  Respiratory: No increased work of breathing, good air exchange, clear to auscultation bilaterally, no crackles or wheezing  Cardiovascular: Normal apical impulse, regular rate and rhythm, normal S1 and S2, no S3 or S4, and no murmur noted  GI: No scars, normal bowel sounds, soft, non-distended, non-tender, no masses palpated, no hepatosplenomegally  Genitounirinary: deferred  Skin: normal skin color, texture, turgor and no redness, warmth, or swelling. No jaundice  Musculoskeletal: There is no redness, warmth, or swelling of the joints.  Full range of motion noted.    Neurologic: Awake, alert, oriented to name, place and time.  Cranial nerves II-XII are grossly intact.    Neuropsychiatric: General: dismissive, irritable, and normal eye contact    Data   Data reviewed today:   Recent Labs   Lab 05/21/24  1101 05/15/24  1837   WBC  --  5.8   HGB  --  15.6   MCV  --  92   PLT  --   300    139   POTASSIUM 4.1 3.9   CHLORIDE 105 92*   CO2 31* 27   BUN 9.2 4.9*   CR 0.84 0.72   ANIONGAP 8 20*   FELIZ 9.6 10.1*   GLC 79 123*   ALBUMIN 4.2 5.0   PROTTOTAL 6.3* 7.6   BILITOTAL 0.9 3.3*   ALKPHOS 76 94   ALT 1,138* 382*   * 198*       No results found for this or any previous visit (from the past 24 hour(s)).

## 2024-05-21 NOTE — PLAN OF CARE
Problem: Adult Behavioral Health Plan of Care  Goal: Patient-Specific Goal (Individualization)  Description: Patient will sleep 6 to 8 hours per night  Patient will eat at least 50% of meals  Patient will attend at least 50% of groups  Patient will comply with recommendations of treatment team  Patient will remain medication compliant      Outcome: Progressing  Note: Report received from Catarino. Rounding complete. Pt observed sleeping in right side lying position with regular and unlabored respirations.    Pt has been in bed with eyes closed and regular respirations. 15 minute and PRN checks all night. No complaints offered. Will continue to monitor.    Pt slept approx  6.5  hours this NOC shift. Between eves and NOC pt slept a total of 14 hours almost uninterrupted.    Face to face end of shift report communicated to oncoming RN.    Moira CASSIDY RN  May 21, 2024  4:18 AM          Problem: Thought Process Alteration  Goal: Optimal Thought Clarity  Description: Patient will be able to have a reality based conversation by discharge.   Outcome: Progressing  Note: Unable to assess due to pt sleeping. No issues or concerns noted at this time.     Goal Outcome Evaluation:

## 2024-05-21 NOTE — PROVIDER NOTIFICATION
DATE/TIME OF CALL RECEIVED FROM LAB:  05/21/24 at 11:42 AM   LAB TEST:  AST and ALT  LAB VALUE:  AST- 841, ALT 1138   PROVIDER NOTIFIED?: Yes  PROVIDER NAME: Elva Nicole CNP  DATE/TIME LAB VALUE REPORTED TO PROVIDER: 05/21/24 at 1144AM  MECHANISM OF PROVIDER NOTIFICATION: Phone Call  PROVIDER RESPONSE: Continue to monitor

## 2024-05-21 NOTE — PLAN OF CARE
"Social Service Psychosocial Assessment    Presenting Problem: Pt admitted with increased paranoia. Pt thinks people are threatening him.     Marital Status: in a relationship/ never      Spouse / Children: girlfriend has a child     Psychiatric TX HX: This is pt first time on the unit, pt denies hx of IP psychiatric hospitalizations.     Suicide Risk Assessment: Pt not admitted with SI, Pt denies hx of SI or SIB, pt denies SI today    Access to Lethal Means (explain): Pt denies    Family Psych HX: pt denies       A & Ox: x4      Medication Adherence: See H&P    Medical Issues: See H&P      Visual -Motor Functioning: Good    Communication Skills /Needs: Good    Ethnicity: White      Spirituality/Confucianist Affiliation: no    Clergy Request: No      History: None reported      Living Situation: living with mom and sometimes at my buddies.     ADL s: Independent       Education: Graduated HS, school for EMT    Financial Situation: Okay    Occupation: \"[employer]- they recently cut my hours\"     Leisure & Recreation: Unknown    Childhood History: [place] with parents and 3 siblings      Trauma Abuse HX: Pt denies    Relationship / Sexuality:     Substance Use/ Abuse: Drinking alcohol, marijuana very rarely- like once a week. \"When I was 21 I tried mushrooms but just not for me\"    Chemical Dependency Treatment HX: Pt denies.     Legal Issues: Pt denies     Significant Life Events: Pt denies    Strengths: Ability to communicate needs, in a safe environment, open to resources    Challenges /Limitation: Poor coping skills, current mental health symptoms, lack of insight    Patient Support Contact (Include name, relationship, number, and summary of conversation): Pt has MARQUITA signed for mother Tamika Gay [phone] and father Rancho Gay [phone].      Interventions:         Community-Based Programs- would benefit    Medical/Dental Care- No PCP listed    CD Evaluation/Rule 25/Aftercare- would " benefit    Medication Management- would benefit- resources listed    Individual Therapy- would benefit- resources listed    Insurance Coverage- [employer] [facility] PMAP [place]     Suicide Risk Assessment- Pt not admitted with SI, Pt denies hx of SI or SIB, pt denies SI today.    High Risk Safety Plan- Talk to supports; Call crisis lines; Go to local ER if feeling suicidal.    MAGDALENE Moran  5/21/2024  8:29 AM

## 2024-05-21 NOTE — PLAN OF CARE
Discharge Note:    Patient discharged to home on 5/21/24 @1658 via Private Car accompanied by family.    Writer reviewed discharge instructions in AVS with patient; he verbalized understanding and denied having any questions pertaining to AVS. Patient stable at time of discharge. Patient denies SI, HI, and thoughts of self harm at this time. All personal belongings returned to patient. No discharge medications ordered.    Catarino Olivares RN  5/21/2024  3:52 PM

## 2024-05-22 ENCOUNTER — TELEPHONE (OUTPATIENT)
Dept: BEHAVIORAL HEALTH | Facility: HOSPITAL | Age: 29
End: 2024-05-22

## 2024-05-22 NOTE — TELEPHONE ENCOUNTER
"[facility]: Post-Hospital Discharge Note     Situation   Post hospital discharge call placed 05/22/24.      Clint did not answer. A message was left.  Messages are left with a call back number should they need to reach staff with any questions, need for additional resources, or need assistance setting up a post hospitalization follow up appointments, if unable to do so independently.    Inpatient Mental Health Admission Information:  Admission Date: 5/20/24  Admission Reason: \"A situation happened to me in the city.\"   Clint Gay is a 29-year-old pt with a hx of ETOH use disorder who presented to [facility] ED 5/16/24 with paranoia and potential delusions over several days prior to arrival. 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 Police did respond to the fire station d/t to this situation. Pt presented to the ER reporting thoughts someone was watching him and threatening him in some way. ED reports pt endorses ETOH use, though amounts of ETOH not specified.     Inpatient Mental Health Discharge Information:  Discharge Date: 5/21/24  Discharged to: home/ self care  Discharge Diagnosis: Psychosis, unspecified  2.   Alcohol use disorder, severe    Background    The following information is obtained from the hospital after visit summary and inpatient provider notes.     \"Legal status: Orders Placed This Encounter      Voluntary     Patient was admitted to unit 5 due to the aforementioned presentation. The patient was placed under 15 minute checks to ensure patient safety. The patient participated in unit programming and groups as able.     Mr. Gay did not require seclusion/restraint during hospitalization.      We reviewed with Mr. Gay current and past medication trials including duration, dose, response and side effects. During this hospitalization, the following changes to the patient's " psychotropic medications were made:     PTA psychotropic medications stopped:      -none, pt not prescribed any     PTA psychotropic medications continued/changed:      -none     New psychotropic medications tried and stopped:      -none     New psychotropic medications initiated:      -none     Clint Gay was admitted to [facility] for the above presentation. On admission, pt reported reduction of paranoia and requesting to be discharged. Pt did not appear to be a threat to self or others. Pt reported motivation to pursue and engage in psychotherapy outpatient. He declined psychopharmacologic interventions, specifically daily medications, at this time. He was interested in Xanax or Adderall, however, we discussed how these medications would not be recommended at this time considering concurrent ETOH use.      We discussed that there could be an underlying psychiatric process and that pt should follow up with the psychiatric services provided in his discharge resources as soon as possible. We reviewed his elevated liver enzymes and obtained a PMD appointment for follow up 5/22 at [facility] in [place], [place]. It was recommended to avoid Tylenol and ETOH to avoid potential/worsening hepatic insult. Pt did allow hepatitis B and C laboratory specimens to be collected, currently in process. Pt declined any further workup. He denies any abd issues and does not appear jaundiced. Encouraged abstinence from any other substance, including THC, d/t potential return or worsening of symptoms (paranoia).      Discussed discharge planning with pt as well as parents who agree to provide transport to pt. Pt verbalized understanding of PMD and psychiatric outpatient services. Discussed plan to seek care if symptoms return, such as a local ED, utilizing EMS as well as provided crisis service number in their area. Pt is agreeable with this plan and prepared for discharge.      With these  "changes and supports the patient noticed improvement in their symptoms and felt sufficiently ready for discharge. As a result, Clint Gay was discharged. At the time of discharge, Clint aGy was determined to not be a danger to self or others. At the current time of discharge, the patient does not meet criteria for involuntary hospitalization. On the day of discharge, the patient reports that they do not have suicidal or homicidal ideation. Steps taken to minimize risk include: assessing patient s behavior and thought process daily during hospital stay, discharging patient with adequate plan for follow up for mental and physical health and discussing safety plan of returning to the hospital should the patient ever have thoughts of harming themselves or others. Therefore, based on all available evidence including the factors cited above, the patient does not appear to be at imminent risk for self-harm, and is appropriate for outpatient level of care. However, if patient uses substances or is medication non-adherent, their risk of decompensation and SI will be elevated. This was discussed with the patient.\"        Post Discharge Assessment   How have your symptoms been since being discharged from the hospital? Message left to call back with any questions or conerns  Do you have your discharge instructions/after visit summary? NA  Do you have any questions related to your discharge instructions? NA    Discharge Medication Assessment   Medications were reviewed in full on discharge, including: Medications to be started, medications to be stopped, medications to be continued from preadmission and any side effects.      Prescriptions were e-scribed or sent to their preferred pharmacy at discharge and were able to be filled: NA  Do you have any questions about your medications? NA    Outpatient Plan/Future Appointments  Discharge follow up appointment scheduled within 14 days of discharging from hospital? Yes "   [facility]  5/22@ 2:20pm with Love Palomares  [address]   [facility] [place], [address]  [phone]  F:[phone]     Resources      [facility]  [address] [place] [address] [phone]     [facility]  [address] [place] [address] [phone]  Psychiatry and therapy available here.      [facility]   [address] [place] [address] [phone]  Psychiatry and therapy available here.      [facility]  [address],   [place] [address] [phone]  Psychiatry and therapy available here.      Further information, barriers, or follow up this writer has addressed: N/A

## 2024-05-23 LAB
HBV CORE IGM SERPL QL IA: NONREACTIVE
HCV AB SERPL QL IA: NONREACTIVE

## 2024-10-26 ENCOUNTER — HOSPITAL ENCOUNTER (EMERGENCY)
Facility: CLINIC | Age: 29
Discharge: HOME | End: 2024-10-26
Attending: EMERGENCY MEDICINE | Admitting: EMERGENCY MEDICINE
Payer: COMMERCIAL

## 2024-10-26 VITALS
BODY MASS INDEX: 19.33 KG/M2 | WEIGHT: 135 LBS | SYSTOLIC BLOOD PRESSURE: 122 MMHG | TEMPERATURE: 98 F | OXYGEN SATURATION: 98 % | HEART RATE: 101 BPM | HEIGHT: 70 IN | DIASTOLIC BLOOD PRESSURE: 90 MMHG | RESPIRATION RATE: 18 BRPM

## 2024-10-26 DIAGNOSIS — F10.10 ALCOHOL ABUSE: ICD-10-CM

## 2024-10-26 DIAGNOSIS — F10.90 ALCOHOL USE DISORDER: ICD-10-CM

## 2024-10-26 DIAGNOSIS — F22 PARANOIA (H): ICD-10-CM

## 2024-10-26 DIAGNOSIS — F41.9 ANXIETY: ICD-10-CM

## 2024-10-26 PROCEDURE — 250N000013 HC RX MED GY IP 250 OP 250 PS 637: Performed by: EMERGENCY MEDICINE

## 2024-10-26 PROCEDURE — 99283 EMERGENCY DEPT VISIT LOW MDM: CPT

## 2024-10-26 RX ORDER — HYDROXYZINE HYDROCHLORIDE 25 MG/1
50 TABLET, FILM COATED ORAL ONCE
Status: COMPLETED | OUTPATIENT
Start: 2024-10-26 | End: 2024-10-26

## 2024-10-26 RX ADMIN — HYDROXYZINE HYDROCHLORIDE 50 MG: 25 TABLET, FILM COATED ORAL at 13:30

## 2024-10-26 ASSESSMENT — ACTIVITIES OF DAILY LIVING (ADL)
ADLS_ACUITY_SCORE: 0
ADLS_ACUITY_SCORE: 0

## 2024-10-26 NOTE — ED PROVIDER NOTES
Emergency Department Note      History of Present Illness     Chief Complaint:  Alcohol use and anxiety    HPI   Clint Gay is a 29 year old male who presents with his mother for evaluation of alcohol troubles.  He states that for the past 2 years, he has struggled with heavy alcohol drinking, vodka, though he can go for 5 days in a row without drinking without any apparent withdrawal symptoms.  For the past few months he has been drinking more than usual.  At times he experiences anxiety and insomnia as well as some paranoia.  He gives examples such as when someone came to give a bed on windows at the residence where he lives with his parents, he was puzzled by their arrival as he had not expected or been forewarned, and he wondered what was in the person's bag.  He states that he is startled easily.  No thoughts of self-harm.  He would like to quit drinking.  He denies any medical symptoms such as fevers cough or vomiting.  He is currently unemployed.  He has considered eventually going into the [employer].  Rare marijuana use, no other drugs.  He does not want a go to detox or any treatment program today but does express motivation to quit drinking.  He thinks that drinking is the root problem and that his anxiety insomnia and paranoia are secondary to his drinking.  He has BuSpar prescribed, but does not take it.    Independent Historian: Mother at bedside, who states that he came with her to the emergency department today because he is open to getting help, and she read up about the empath unit and thought the empath unit would be able to help him with his alcohol troubles.    Review of External Notes: I personally reviewed prior records including brief hospitalization at [facility] in May of this year for paranoia and delusions.    Past Medical History     Medical History and Problem List   No past medical history on file.    Medications   No current outpatient medications on file.    Surgical  "History   No past surgical history on file.  Physical Exam     Patient Vitals for the past 24 hrs:   BP Temp Temp src Pulse Resp SpO2 Height Weight   10/26/24 1329 -- -- -- -- -- -- 1.778 m (5' 10\") 61.2 kg (135 lb)   10/26/24 1300 (!) 122/90 -- -- 101 -- 98 % -- --   10/26/24 1151 (!) 147/64 98  F (36.7  C) Oral 120 18 98 % -- 61.2 kg (135 lb)     Physical Exam  General: Nontoxic-appearing male sitting upright in the gurney in room 17, mot oropharynx clearher at bedside  HENT: mucous membranes moist,   Eyes: PERRL without nystagmus  CV: extremities well perfused, regular rhythm  Resp: normal effort, no stridor, no cough observed  GI: abdomen soft and nontender, no guarding  MSK: no bony tenderness   Skin: appropriately warm and dry  Neuro: alert, clear speech, oriented, normal tone in extremities, ambulation independent and steady  Psych: Anxious, cooperative, denies feeling suicidal, no evidence of hallucinations    Diagnostics     ED Course      Medications Administered   Medications   hydrOXYzine HCl (ATARAX) tablet 50 mg (50 mg Oral $Given 10/26/24 1330)     ED Course and Discussion of Management      Additional Documentation  Social Determinants of Health: Employment/Unemployment  and Social Connections/Isolation     Medical Decision Making / Diagnosis   Medical Decision Making:  This very pleasant patient presents with concerns regarding alcohol use as well as some anxiety, insomnia, and paranoia.  Patient's mother had initially hoped he could go to the empath unit.  We discussed the empath unit in detail as well as what it can offer in terms of resources and staffing.  I did offer to have the patient go on a voluntary basis to the empath unit to more fully explore his anxiety, insomnia, and paranoia.  However, patient is not interested in this.  He feels that the root cause of his troubles is alcohol.  This is certainly plausible.  I did explain that [facility] is not primarily a chemical dependency treatment " unit, they demonstrate understanding of this.  We agreed to defer any emergent workup.  Regarding possible alcohol withdrawal, he has no delirium, no tongue fasciculations, no tremors, and his mild tachycardia on arrival resolved during my extended 25-minute conversation with patient and mother at bedside in room 17.  I do not think she requires benzodiazepines or medical admission for alcohol withdrawal.  Patient politely and repeatedly declines my offer to consider detox facility transfer today.  He was given printed resources to facilitate outpatient follow-up with alcohol treatment programs which I do think is in his best interest.  He was encouraged in his desire to seek help for his alcohol use.  He does not meet criteria to be held against his will.  No signs of alcohol intoxication at this time.  Neurologic exam is benign.  I also placed a referral in epic for chemical dependency treatment, he should be called early this week to help arrange next steps.  In the meantime I specifically asked him to return here for acute worsening at any hour.  Patient and mother demonstrated good understanding of his situation and agreement with this plan.  He was given a dose of oral anxiolytic prior to discharge.    Disposition   Discharged    Diagnosis     ICD-10-CM    1. Alcohol use disorder  F10.90 Adult Mental Health  Referral      2. Anxiety  F41.9 Adult Mental Health  Referral      3. Paranoia (H)  F22 Adult Mental Health  Referral      10/26/2024   MD Darien Mahan Jeffrey Alan, MD  10/26/24 8534

## 2024-10-26 NOTE — ED TRIAGE NOTES
Pt sts he jsut finished binging, last drink  2 days ago. Pt concerned that he is not sleeping. Pt wants to go to rehab     Triage Assessment (Adult)       Row Name 10/26/24 1152          Triage Assessment    Airway WDL WDL        Respiratory WDL    Respiratory WDL WDL        Skin Circulation/Temperature WDL    Skin Circulation/Temperature WDL WDL        Cardiac WDL    Cardiac WDL WDL        Peripheral/Neurovascular WDL    Peripheral Neurovascular WDL WDL        Cognitive/Neuro/Behavioral WDL    Cognitive/Neuro/Behavioral WDL WDL

## (undated) RX ORDER — LIDOCAINE HYDROCHLORIDE AND EPINEPHRINE 10; 10 MG/ML; UG/ML
INJECTION, SOLUTION INFILTRATION; PERINEURAL
Status: DISPENSED
Start: 2021-05-08